# Patient Record
Sex: MALE | Race: WHITE | Employment: FULL TIME | URBAN - METROPOLITAN AREA
[De-identification: names, ages, dates, MRNs, and addresses within clinical notes are randomized per-mention and may not be internally consistent; named-entity substitution may affect disease eponyms.]

---

## 2018-02-15 ENCOUNTER — HOSPITAL ENCOUNTER (INPATIENT)
Age: 23
LOS: 6 days | Discharge: HOME | DRG: 881 | End: 2018-02-22
Attending: EMERGENCY MEDICINE | Admitting: PSYCHIATRY & NEUROLOGY
Payer: COMMERCIAL

## 2018-02-15 DIAGNOSIS — F29 PSYCHOSIS, UNSPECIFIED PSYCHOSIS TYPE (HCC): Primary | ICD-10-CM

## 2018-02-15 LAB
ALBUMIN SERPL-MCNC: 4.4 G/DL (ref 3.5–5)
ALBUMIN/GLOB SERPL: 1.2 {RATIO} (ref 1.1–2.2)
ALP SERPL-CCNC: 57 U/L (ref 45–117)
ALT SERPL-CCNC: 45 U/L (ref 12–78)
AMPHET UR QL SCN: NEGATIVE
ANION GAP SERPL CALC-SCNC: 16 MMOL/L (ref 5–15)
APPEARANCE UR: ABNORMAL
AST SERPL-CCNC: 27 U/L (ref 15–37)
BACTERIA URNS QL MICRO: NEGATIVE /HPF
BARBITURATES UR QL SCN: NEGATIVE
BASOPHILS # BLD: 0.1 K/UL (ref 0–0.1)
BASOPHILS NFR BLD: 1 % (ref 0–1)
BENZODIAZ UR QL: NEGATIVE
BILIRUB SERPL-MCNC: 0.6 MG/DL (ref 0.2–1)
BILIRUB UR QL CFM: NEGATIVE
BUN SERPL-MCNC: 17 MG/DL (ref 6–20)
BUN/CREAT SERPL: 15 (ref 12–20)
CALCIUM SERPL-MCNC: 9.7 MG/DL (ref 8.5–10.1)
CANNABINOIDS UR QL SCN: POSITIVE
CHLORIDE SERPL-SCNC: 102 MMOL/L (ref 97–108)
CO2 SERPL-SCNC: 21 MMOL/L (ref 21–32)
COCAINE UR QL SCN: NEGATIVE
COLOR UR: ABNORMAL
CREAT SERPL-MCNC: 1.11 MG/DL (ref 0.7–1.3)
DIFFERENTIAL METHOD BLD: ABNORMAL
DRUG SCRN COMMENT,DRGCM: ABNORMAL
EOSINOPHIL # BLD: 0 K/UL (ref 0–0.4)
EOSINOPHIL NFR BLD: 0 % (ref 0–7)
EPITH CASTS URNS QL MICRO: ABNORMAL /LPF
ERYTHROCYTE [DISTWIDTH] IN BLOOD BY AUTOMATED COUNT: 13.2 % (ref 11.5–14.5)
ETHANOL SERPL-MCNC: <10 MG/DL
GLOBULIN SER CALC-MCNC: 3.6 G/DL (ref 2–4)
GLUCOSE SERPL-MCNC: 117 MG/DL (ref 65–100)
GLUCOSE UR STRIP.AUTO-MCNC: NEGATIVE MG/DL
HCT VFR BLD AUTO: 44.8 % (ref 36.6–50.3)
HGB BLD-MCNC: 15.6 G/DL (ref 12.1–17)
HGB UR QL STRIP: NEGATIVE
IMM GRANULOCYTES # BLD: 0 K/UL (ref 0–0.04)
IMM GRANULOCYTES NFR BLD AUTO: 0 % (ref 0–0.5)
KETONES UR QL STRIP.AUTO: 40 MG/DL
LEUKOCYTE ESTERASE UR QL STRIP.AUTO: ABNORMAL
LYMPHOCYTES # BLD: 3.5 K/UL (ref 0.8–3.5)
LYMPHOCYTES NFR BLD: 26 % (ref 12–49)
MCH RBC QN AUTO: 29.8 PG (ref 26–34)
MCHC RBC AUTO-ENTMCNC: 34.8 G/DL (ref 30–36.5)
MCV RBC AUTO: 85.5 FL (ref 80–99)
METHADONE UR QL: NEGATIVE
MONOCYTES # BLD: 1.5 K/UL (ref 0–1)
MONOCYTES NFR BLD: 11 % (ref 5–13)
MUCOUS THREADS URNS QL MICRO: ABNORMAL /LPF
NEUTS SEG # BLD: 8.4 K/UL (ref 1.8–8)
NEUTS SEG NFR BLD: 62 % (ref 32–75)
NITRITE UR QL STRIP.AUTO: NEGATIVE
NRBC # BLD: 0 K/UL (ref 0–0.01)
NRBC BLD-RTO: 0 PER 100 WBC
OPIATES UR QL: NEGATIVE
PCP UR QL: NEGATIVE
PH UR STRIP: 6 [PH] (ref 5–8)
PLATELET # BLD AUTO: 456 K/UL (ref 150–400)
PMV BLD AUTO: 9.9 FL (ref 8.9–12.9)
POTASSIUM SERPL-SCNC: 3.6 MMOL/L (ref 3.5–5.1)
PROT SERPL-MCNC: 8 G/DL (ref 6.4–8.2)
PROT UR STRIP-MCNC: 30 MG/DL
RBC # BLD AUTO: 5.24 M/UL (ref 4.1–5.7)
RBC #/AREA URNS HPF: ABNORMAL /HPF (ref 0–5)
SODIUM SERPL-SCNC: 139 MMOL/L (ref 136–145)
SP GR UR REFRACTOMETRY: 1.02 (ref 1–1.03)
UA: UC IF INDICATED,UAUC: ABNORMAL
UROBILINOGEN UR QL STRIP.AUTO: 0.2 EU/DL (ref 0.2–1)
WBC # BLD AUTO: 13.5 K/UL (ref 4.1–11.1)
WBC URNS QL MICRO: ABNORMAL /HPF (ref 0–4)

## 2018-02-15 PROCEDURE — 74011250637 HC RX REV CODE- 250/637: Performed by: PHYSICIAN ASSISTANT

## 2018-02-15 PROCEDURE — 80307 DRUG TEST PRSMV CHEM ANLYZR: CPT | Performed by: PHYSICIAN ASSISTANT

## 2018-02-15 PROCEDURE — 85025 COMPLETE CBC W/AUTO DIFF WBC: CPT | Performed by: PHYSICIAN ASSISTANT

## 2018-02-15 PROCEDURE — 81001 URINALYSIS AUTO W/SCOPE: CPT | Performed by: PHYSICIAN ASSISTANT

## 2018-02-15 PROCEDURE — 99285 EMERGENCY DEPT VISIT HI MDM: CPT

## 2018-02-15 PROCEDURE — 36415 COLL VENOUS BLD VENIPUNCTURE: CPT | Performed by: PHYSICIAN ASSISTANT

## 2018-02-15 PROCEDURE — 80053 COMPREHEN METABOLIC PANEL: CPT | Performed by: PHYSICIAN ASSISTANT

## 2018-02-15 PROCEDURE — 90791 PSYCH DIAGNOSTIC EVALUATION: CPT

## 2018-02-15 RX ORDER — LORAZEPAM 1 MG/1
1 TABLET ORAL
Status: COMPLETED | OUTPATIENT
Start: 2018-02-15 | End: 2018-02-15

## 2018-02-15 RX ADMIN — LORAZEPAM 1 MG: 1 TABLET ORAL at 22:20

## 2018-02-15 NOTE — IP AVS SNAPSHOT
[address] [facility] 
 
 
 [address] Sagar Hickman Patient: Jey Nguyen MRN: [id number] :1995 A check lisa indicates which time of day the medication should be taken. My Medications START taking these medications Instructions Each Dose to Equal  
 Morning Noon Evening Bedtime  
 escitalopram oxalate 5 mg tablet Commonly known as:  Seretha Merced Start taking on:  2018 Your last dose was: Your next dose is: Take 1 Tab by mouth daily for 14 days. Indications: ANXIETY WITH DEPRESSION  
 5 mg OLANZapine 10 mg tablet Commonly known as:  ZyPREXA Your last dose was: Your next dose is: Take 1 Tab by mouth two (2) times a day for 14 days. Indications: DEPRESSION TREATMENT ADJUNCT, Sveta associated with Bipolar Disorder 10 mg  
    
   
   
   
  
  
STOP taking these medications ATIVAN 1 mg tablet Generic drug:  LORazepam  
   
  
  
  
Where to Get Your Medications Information on where to get these meds will be given to you by the nurse or doctor. ! Ask your nurse or doctor about these medications  
  escitalopram oxalate 5 mg tablet OLANZapine 10 mg tablet

## 2018-02-15 NOTE — ED TRIAGE NOTES
Unsure why patient is here tonight. He relays \" I want to make sure my man is OK\". Patient unable to relay why he is here today. Mother states patient is not talking rationally. Patient was in [address] ED, transferred to [address] [place] [address] under TDO, Samaria An discharged after examining this am. Patient has hx of anxiety, took Ativan 1mg that was prescribed.  Denies HI/SI

## 2018-02-15 NOTE — IP AVS SNAPSHOT
[facility] 
 
 
 [address] Sagar Keyur Palma Patient: Cuate  MRN: [id number] :1995 About your hospitalization You were admitted on:  2018 You last received care in the:  [facility]. 291 You were discharged on:  2018 Why you were hospitalized Your primary diagnosis was:  Not on File Your diagnoses also included:  Psychosis, Anxiety Follow-up Information Follow up With Details Comments Contact Info Eileen Delgadillo NP  Please follow up with Eileen Delgadillo NP on Thurs. 3/15/18 at 12:30pm. Arrive 20-30 minutes early to complete paperwork. Bring photo ID and insurance card. You will also be scheduled with a therapist in the future. Magen Jewellrashmi Ana Mariajez [address] Phone:  [phone]) Fax:   [phone] None   None ([phone]) Patient stated that they have no PCP 
  
 therapists  If you prefer to try to find a therapist sooner than when [facility] can arrange, please see attached list of therapists and/or seek one on www. [email or web address]. [employer] Discharge Orders None A check lisa indicates which time of day the medication should be taken. My Medications START taking these medications Instructions Each Dose to Equal  
 Morning Noon Evening Bedtime  
 escitalopram oxalate 5 mg tablet Commonly known as:  Guanako Lawson Start taking on:  2018 Your last dose was: Your next dose is: Take 1 Tab by mouth daily for 14 days. Indications: ANXIETY WITH DEPRESSION  
 5 mg OLANZapine 10 mg tablet Commonly known as:  ZyPREXA Your last dose was: Your next dose is: Take 1 Tab by mouth two (2) times a day for 14 days. Indications: DEPRESSION TREATMENT ADJUNCT, Sveta associated with Bipolar Disorder  10 mg  
    
   
   
   
  
 STOP taking these medications ATIVAN 1 mg tablet Generic drug:  LORazepam  
   
  
  
  
Where to Get Your Medications Information on where to get these meds will be given to you by the nurse or doctor. ! Ask your nurse or doctor about these medications  
  escitalopram oxalate 5 mg tablet OLANZapine 10 mg tablet Discharge Instructions DISCHARGE SUMMARY from Nurse The following personal items are in your possession at time of discharge: 
 
Dental Appliances: None Visual Aid: Glasses, With patient Home Medications: None Jewelry: None Clothing: Shirt, Socks, Sweater, Undergarments, Pants, Footwear, Darlin Other Valuables: Money (comment) ($8) 
Personal Items Sent to Safe: $8 
 
 
PATIENT INSTRUCTIONS: 
 
If I feel that I can not keep these promises and I am at risk of hurting myself or others, I will call the crisis office and speak with a crisis worker who will assist me during my crisis. [address] [place] [address]  [phone] [address] [place] [address]   [phone] [facility] Ghanshyam [place] Crisis  [phone] [facility]  [phone] Lifestyle Tips: 
Appointment after discharge and follow up phone call: After being discharged, if your appointment does not work for you, please feel free to contact the physician's office to change the appointment. Medications: Take your medicines exactly as instructed. Ask your doctor or nurse if you have questions about your medicines. Tell your doctor right away if you have problems with your medicines. Diet: 
 Follow any special diet orders from your doctor. Ask your doctor how much salt (sodium) you should have each day. Ask if you need to limit the amount or types of fluids you drink each day. Weight Monitoring: If you are overweight, ask about a weight loss plan that is right for you. Weight gain may mean that fluids are building up in your body. Weigh yourself every day at about the same time and write it down. Do Not Smoke: If you smoke, quit. Smoking is bad for your health. Avoid second hand smoke. Call the [address] for help at 0-331.780.6119 [facility] Now) Other Tips: 
 Avoid people with the flu or pneumonia Keep all of your doctor appointments Carry a list of your medications, allergies and the shots you have had These are general instructions for a healthy lifestyle: No smoking/ No tobacco products/ Avoid exposure to second hand smoke Surgeon General's Warning:  Quitting smoking now greatly reduces serious risk to your health. Obesity, smoking, and sedentary lifestyle greatly increases your risk for illness A healthy diet, regular physical exercise & weight monitoring are important for maintaining a healthy lifestyle Recognize signs and symptoms of STROKE: 
 
F-face looks uneven A-arms unable to move or move unevenly S-speech slurred or non-existent T-time-call 911 as soon as signs and symptoms begin-DO NOT go Back to bed or wait to see if you get better-TIME IS BRAIN. Warning Signs of HEART ATTACK Call 911 if you have these symptoms: 
? Chest discomfort. Most heart attacks involve discomfort in the center of the chest that lasts more than a few minutes, or that goes away and comes back. It can feel like uncomfortable pressure, squeezing, fullness, or pain. ? Discomfort in other areas of the upper body. Symptoms can include pain or discomfort in one or both arms, the back, neck, jaw, or stomach. ? Shortness of breath with or without chest discomfort. ? Other signs may include breaking out in a cold sweat, nausea, or lightheadedness. Don't wait more than five minutes to call [address]! Fast action can save your life. Calling 911 is almost always the fastest way to get lifesaving treatment.  Emergency Medical Services staff can begin treatment when they arrive  up to an hour sooner than if someone gets to the hospital by car. Myself and/or my family have received education about my diagnosis throughout my hospital stay. During my hospital stay, I and/or my family/caregiver were included in planning my care upon discharge. My needs were taken into consideration and I was included in my discharge planning. I had an opportunity to ask questions. The discharge information has been reviewed with the patient. The patient verbalized understanding. Discharge medications reviewed with the patient and appropriate educational materials and side effects teaching were provided. Introducing [facility]! [facility] introduces Schedulize patient portal. Now you can access parts of your medical record, email your doctor's office, and request medication refills online. 1. In your internet browser, go to [email or web address] 2. Click on the First Time User? Click Here link in the Sign In box. You will see the New Member Sign Up page. 3. Enter your Schedulize Access Code exactly as it appears below. You will not need to use this code after youve completed the sign-up process. If you do not sign up before the expiration date, you must request a new code. · Schedulize Access Code: [email or web address] Expires: 5/23/2018 10:16 AM 
 
4. Enter the last four digits of your Social Security Number (xxxx) and Date of Birth (mm/dd/yyyy) as indicated and click Submit. You will be taken to the next sign-up page. 5. Create a Schedulize ID. This will be your Schedulize login ID and cannot be changed, so think of one that is secure and easy to remember. 6. Create a Schedulize password. You can change your password at any time. 7. Enter your Password Reset Question and Answer. This can be used at a later time if you forget your password. 8. Enter your e-mail address.  You will receive e-mail notification when new information is available in Printi. 9. Click Sign Up. You can now view and download portions of your medical record. 10. Click the Download Summary menu link to download a portable copy of your medical information. If you have questions, please visit the Frequently Asked Questions section of the Printi website. Remember, Printi is NOT to be used for urgent needs. For medical emergencies, dial 911. Now available from your iPhone and Android! Providers Seen During Your Hospitalization Provider Specialty Primary office phone Sima Martinez MD Emergency Medicine [phone] Kylie Gil MD Psychiatry [phone] Abbe De Los Santos MD Psychiatry [phone] Immunizations Administered for This Admission Name Date Influenza Vaccine (Quad) PF 2/16/2018 Your Primary Care Physician (PCP) Primary Care Physician Office Phone Office Fax NONE ** None ** ** None ** You are allergic to the following No active allergies Recent Documentation Height Weight BMI Smoking Status 1.905 m 90.7 kg 25 kg/m2 Current Some Day Smoker Emergency Contacts Name Discharge Info Relation Home Work Mobile Kirstin Jewels [1] Boyfriend [17] [phone] Vahid Salts [1] Sister [23] [phone] Patient Belongings The following personal items are in your possession at time of discharge: 
  Dental Appliances: None  Visual Aid: Glasses, With patient      Home Medications: None   Jewelry: None  Clothing: Shirt, Socks, Sweater, Undergarments, Pants, Footwear, Darlin    Other Valuables: Money (comment) ($8)  Personal Items Sent to Safe: $8 
 
  
  
 Please provide this summary of care documentation to your next provider. Signatures-by signing, you are acknowledging that this After Visit Summary has been reviewed with you and you have received a copy.   
  
 
  
    
    
 Patient Signature: ____________________________________________________________ Date:  ____________________________________________________________  
  
Hurshel Och Provider Signature:  ____________________________________________________________ Date:  ____________________________________________________________

## 2018-02-15 NOTE — IP AVS SNAPSHOT
Summary of Care Report The Summary of Care report has been created to help improve care coordination. Users with access to [employer] network or [address] (Web-based application) may access additional patient information including the Discharge Summary. If you are not currently a [address] user and need more information, please call the number listed below in the [place] [address] section and ask to be connected with Medical Records. Facility Information Name Address Phone [facility] [address] [place] [id number] [phone] Patient Information Patient Name Sex ARABELLA Orozco ([id number]) Male 1995 Discharge Information Admitting Provider Service Area Unit Nathanael Navarro MD / [phone] [address] [address] [facility] / [phone] Discharge Provider Discharge Date/Time Discharge Disposition Destination (none) 2018 Midday (Pending) AHR (none) Patient Language Language ENGLISH [13] Hospital Problems as of 2018  Never Reviewed Class Noted - Resolved Last Modified POA Active Problems Psychosis  2/15/2018 - Present 2/15/2018 by Nathanael Navarro MD Unknown Entered by Nathanael Navarro MD  
  Anxiety  2018 - Present 2018 by Nathanael Navarro MD Unknown Entered by Nathanael Navarro MD  
  
You are allergic to the following No active allergies Current Discharge Medication List  
  
START taking these medications Dose & Instructions Dispensing Information Comments  
 escitalopram oxalate 5 mg tablet Commonly known as:  Clevester Clap Start taking on:  2018 Dose:  5 mg Take 1 Tab by mouth daily for 14 days. Indications: ANXIETY WITH DEPRESSION Quantity:  14 Tab Refills:  1 OLANZapine 10 mg tablet Commonly known as:  ZyPREXA  Dose:  10 mg  
 Take 1 Tab by mouth two (2) times a day for 14 days. Indications: DEPRESSION TREATMENT ADJUNCT, Sveta associated with Bipolar Disorder Quantity:  28 Tab Refills:  1 STOP taking these medications Comments ATIVAN 1 mg tablet Generic drug:  LORazepam  
   
   
  
  
  
Current Immunizations Name Date Influenza Vaccine (Quad) PF 2/16/2018 Follow-up Information Follow up With Details Comments Contact Info Eileen Delgadillo NP  Please follow up with Eileen Delgadillo NP on Thurs. 3/15/18 at 12:30pm. Arrive 20-30 minutes early to complete paperwork. Bring photo ID and insurance card. You will also be scheduled with a therapist in the future. Magen Rodgersjez 424 [address] Phone:  [phone]) Fax:   [phone] None   None (435) Patient stated that they have no PCP 
  
 therapists  If you prefer to try to find a therapist sooner than when [facility] can arrange, please see attached list of therapists and/or seek one on [email or web address] Discharge Instructions DISCHARGE SUMMARY from Nurse The following personal items are in your possession at time of discharge: 
 
Dental Appliances: None Visual Aid: Glasses, With patient Home Medications: None Jewelry: None Clothing: Shirt, Socks, Sweater, Undergarments, Pants, Footwear, Darlin Other Valuables: Money (comment) ($8) 
Personal Items Sent to Safe: $8 
 
 
PATIENT INSTRUCTIONS: 
 
If I feel that I can not keep these promises and I am at risk of hurting myself or others, I will call the crisis office and speak with a crisis worker who will assist me during my crisis. [address] [place] [address]  [phone] [address] [place] [address] 19   [phone] [place] [facility] [place] Crisis  [phone] [facility] Crisis  [phone] Lifestyle Tips: 
Appointment after discharge and follow up phone call: After being discharged, if your appointment does not work for you, please feel free to contact the physician's office to change the appointment. Medications: Take your medicines exactly as instructed. Ask your doctor or nurse if you have questions about your medicines. Tell your doctor right away if you have problems with your medicines. Diet: 
 Follow any special diet orders from your doctor. Ask your doctor how much salt (sodium) you should have each day. Ask if you need to limit the amount or types of fluids you drink each day. Weight Monitoring: If you are overweight, ask about a weight loss plan that is right for you. Weight gain may mean that fluids are building up in your body. Weigh yourself every day at about the same time and write it down. Do Not Smoke: If you smoke, quit. Smoking is bad for your health. Avoid second hand smoke. Call the [employer] for help at 1-957.681.8858 [facility] Now) Other Tips: 
 Avoid people with the flu or pneumonia Keep all of your doctor appointments Carry a list of your medications, allergies and the shots you have had These are general instructions for a healthy lifestyle: No smoking/ No tobacco products/ Avoid exposure to second hand smoke Surgeon General's Warning:  Quitting smoking now greatly reduces serious risk to your health. Obesity, smoking, and sedentary lifestyle greatly increases your risk for illness A healthy diet, regular physical exercise & weight monitoring are important for maintaining a healthy lifestyle Recognize signs and symptoms of STROKE: 
 
F-face looks uneven A-arms unable to move or move unevenly S-speech slurred or non-existent T-time-call 911 as soon as signs and symptoms begin-DO NOT go Back to bed or wait to see if you get better-TIME IS BRAIN. Warning Signs of HEART ATTACK Call 911 if you have these symptoms: ? Chest discomfort. Most heart attacks involve discomfort in the center of the chest that lasts more than a few minutes, or that goes away and comes back. It can feel like uncomfortable pressure, squeezing, fullness, or pain. ? Discomfort in other areas of the upper body. Symptoms can include pain or discomfort in one or both arms, the back, neck, jaw, or stomach. ? Shortness of breath with or without chest discomfort. ? Other signs may include breaking out in a cold sweat, nausea, or lightheadedness. Don't wait more than five minutes to call [address]! Fast action can save your life. Calling 911 is almost always the fastest way to get lifesaving treatment. Emergency Medical Services staff can begin treatment when they arrive  up to an hour sooner than if someone gets to the hospital by car. Myself and/or my family have received education about my diagnosis throughout my hospital stay. During my hospital stay, I and/or my family/caregiver were included in planning my care upon discharge. My needs were taken into consideration and I was included in my discharge planning. I had an opportunity to ask questions. The discharge information has been reviewed with the patient. The patient verbalized understanding. Discharge medications reviewed with the patient and appropriate educational materials and side effects teaching were provided. Chart Review Routing History No Routing History on File

## 2018-02-16 PROBLEM — F41.9 ANXIETY: Status: ACTIVE | Noted: 2018-02-16

## 2018-02-16 PROCEDURE — 90686 IIV4 VACC NO PRSV 0.5 ML IM: CPT | Performed by: PSYCHIATRY & NEUROLOGY

## 2018-02-16 PROCEDURE — 74011250637 HC RX REV CODE- 250/637: Performed by: PHYSICIAN ASSISTANT

## 2018-02-16 PROCEDURE — 74011250637 HC RX REV CODE- 250/637: Performed by: PSYCHIATRY & NEUROLOGY

## 2018-02-16 PROCEDURE — 74011250636 HC RX REV CODE- 250/636: Performed by: PSYCHIATRY & NEUROLOGY

## 2018-02-16 PROCEDURE — 90471 IMMUNIZATION ADMIN: CPT

## 2018-02-16 PROCEDURE — 65220000003 HC RM SEMIPRIVATE PSYCH

## 2018-02-16 RX ORDER — OLANZAPINE 5 MG/1
5 TABLET ORAL EVERY EVENING
Status: DISCONTINUED | OUTPATIENT
Start: 2018-02-16 | End: 2018-02-17

## 2018-02-16 RX ORDER — BENZTROPINE MESYLATE 2 MG/1
2 TABLET ORAL
Status: DISCONTINUED | OUTPATIENT
Start: 2018-02-16 | End: 2018-02-22 | Stop reason: HOSPADM

## 2018-02-16 RX ORDER — LORAZEPAM 2 MG/ML
2 INJECTION INTRAMUSCULAR
Status: DISCONTINUED | OUTPATIENT
Start: 2018-02-16 | End: 2018-02-22 | Stop reason: HOSPADM

## 2018-02-16 RX ORDER — ADHESIVE BANDAGE
30 BANDAGE TOPICAL DAILY PRN
Status: DISCONTINUED | OUTPATIENT
Start: 2018-02-16 | End: 2018-02-22 | Stop reason: HOSPADM

## 2018-02-16 RX ORDER — IBUPROFEN 400 MG/1
400 TABLET ORAL
Status: DISCONTINUED | OUTPATIENT
Start: 2018-02-16 | End: 2018-02-22 | Stop reason: HOSPADM

## 2018-02-16 RX ORDER — HALOPERIDOL 5 MG/1
5 TABLET ORAL
Status: COMPLETED | OUTPATIENT
Start: 2018-02-16 | End: 2018-02-16

## 2018-02-16 RX ORDER — OLANZAPINE 5 MG/1
5 TABLET ORAL
Status: DISCONTINUED | OUTPATIENT
Start: 2018-02-16 | End: 2018-02-22 | Stop reason: HOSPADM

## 2018-02-16 RX ORDER — LORAZEPAM 1 MG/1
1 TABLET ORAL 3 TIMES DAILY
COMMUNITY
End: 2018-02-22

## 2018-02-16 RX ORDER — ACETAMINOPHEN 325 MG/1
650 TABLET ORAL
Status: DISCONTINUED | OUTPATIENT
Start: 2018-02-16 | End: 2018-02-22 | Stop reason: HOSPADM

## 2018-02-16 RX ORDER — DIPHENHYDRAMINE HCL 25 MG
25 CAPSULE ORAL
Status: COMPLETED | OUTPATIENT
Start: 2018-02-16 | End: 2018-02-16

## 2018-02-16 RX ORDER — ZOLPIDEM TARTRATE 10 MG/1
10 TABLET ORAL
Status: DISCONTINUED | OUTPATIENT
Start: 2018-02-16 | End: 2018-02-22 | Stop reason: HOSPADM

## 2018-02-16 RX ORDER — ESCITALOPRAM OXALATE 10 MG/1
5 TABLET ORAL DAILY
Status: DISCONTINUED | OUTPATIENT
Start: 2018-02-16 | End: 2018-02-22 | Stop reason: HOSPADM

## 2018-02-16 RX ORDER — BENZTROPINE MESYLATE 1 MG/ML
2 INJECTION INTRAMUSCULAR; INTRAVENOUS
Status: DISCONTINUED | OUTPATIENT
Start: 2018-02-16 | End: 2018-02-22 | Stop reason: HOSPADM

## 2018-02-16 RX ORDER — LORAZEPAM 1 MG/1
1 TABLET ORAL
Status: DISCONTINUED | OUTPATIENT
Start: 2018-02-16 | End: 2018-02-19

## 2018-02-16 RX ADMIN — INFLUENZA VIRUS VACCINE 0.5 ML: 15; 15; 15; 15 SUSPENSION INTRAMUSCULAR at 10:23

## 2018-02-16 RX ADMIN — ZOLPIDEM TARTRATE 10 MG: 10 TABLET ORAL at 21:04

## 2018-02-16 RX ADMIN — OLANZAPINE 5 MG: 5 TABLET, FILM COATED ORAL at 17:00

## 2018-02-16 RX ADMIN — DIPHENHYDRAMINE HYDROCHLORIDE 25 MG: 25 CAPSULE ORAL at 00:05

## 2018-02-16 RX ADMIN — HALOPERIDOL 5 MG: 5 TABLET ORAL at 00:05

## 2018-02-16 RX ADMIN — LORAZEPAM 1 MG: 1 TABLET ORAL at 10:21

## 2018-02-16 RX ADMIN — ESCITALOPRAM OXALATE 5 MG: 10 TABLET ORAL at 10:20

## 2018-02-16 NOTE — ED NOTES
Pt medicated for continued anxiety, VSS. Friend at bedside providing support. Will continue to monitor.

## 2018-02-16 NOTE — BSMART NOTE
Comprehensive Assessment Form Part 1    Section I - Disposition    Axis I - Unspecified Psychosis  Axis II - Deferred  Axis III - None  Axis IV - Stressors related to social group  [facility] - 30      The Medical Doctor to Psychiatrist conference was not completed. The Medical Doctor is in agreement with Psychiatrist disposition because of (reason) patient is willing to be treated voluntarily. The plan is admit to [facility].  The on-call Psychiatrist consulted was Dr. Moe Morejon. The admitting Psychiatrist will be Dr. Moe Morejon. The admitting Diagnosis is Unspecified Psychosis. The Payor source is [employer]. Section II - Integrated Summary  Summary:  IP is a 23year old male seen face to face in the ER. Patient was brought in by his mother and sister for a mental health evaluation because he is having periods where he is not speaking rationally. He has not been sleeping and they are concerned about his ability to care for himself based on his current presentation. He has a history of treatment for anxiety when he was in middle school, high school, and college, however when he moved to [facility] he did not continue with psychiatric treatment. He was taken by his partner to [facility] around midnight on Tuesday, and was [facility] Wednesday afternoon for an unknown reason. He was TDOed to [email or web address] last night because there were no beds, and was transferred there around 3am.  He had his hearing today and was released as he was not deemed to be acute enough to need treatment at the [facility].  He has no current outpatient treatment providers. Patient denies suicidal and homicidal ideation. When he initially arrived in the ER he reported he came to the hospital because \"I want to make sure my man's okay. \"  He then told the ER provider he came to the ER because \"I'm trying to get  and there are 4 things I have to do. \"  When asked by this  why he came to the ER, he stated \"I want to get better. \"  He is very focused about his relationship with his partner, and being concerned that he wants to leave him, although his partner states there is no reason for him to be concerned about this. He also keeps bringing up wanting to get , which apparently has also never been discussed prior to the past few days. He does realize that his thoughts have been very \"scattered\" recently and he has been told he is not making sense. He reported he would like to figure out why this is happening and get help. Patient has a full time job at [employer]. He reports he has not slept but a few hours over the past 4 days. He also reports he has not had much of an appetite. His mother reports that he has a group of friends that he plays Dungeons and Dragons with that he has been friends with for years. He has struggled with leaving the group of friends because they are not healthy individuals. Patient told one this week that he no longer wanted to participate in the group. He now feels responsible for what \"damage he (the friend) could do to himself or his home. \"  Patient was pleasant and cooperative with the assessment. He did present as somewhat labile at times, and cried several times unexpectedly. He was easily redirectable. He is willing to be treated voluntarily. The patient has demonstrated mental capacity to provide informed consent. The information is given by the patient, SO, mother, and sister. The Chief Complaint is mental health problem. The Precipitant Factors are stressors related to social group. Previous Hospitalizations: yes, yesterday was TDOed to [facility] and was released today at hearing  The patient has not previously been in restraints. Current Psychiatrist and/or  is NA. Lethality Assessment:    The potential for suicide is not noted. The potential for homicide is not noted. The patient has not been a perpetrator of sexual or physical abuse.   There are not pending charges. The patient is not felt to be at risk for self harm or harm to others. The attending nurse was advised that security has not been notified. Section III - Psychosocial  The patient's overall mood and attitude is anxious. Feelings of helplessness and hopelessness are not observed. Generalized anxiety is observed by patient's behaviors and self report. Panic is not observed. Phobias are not observed. Obsessive compulsive tendencies are not observed. Section IV - Mental Status Exam  The patient's appearance is tense. The patient's behavior is restless. The patient is oriented to time, place, person and situation. The patient's speech is pressured. The patient's mood is anxious. The range of affect is labile. The patient's thought content demonstrates no evidence of impairment. The thought process shows a flight of ideas and is tangential.  The patient's perception shows no evidence of impairment. The patient's memory shows no evidence of impairment. The patient's appetite is decreased. The patient's sleep has evidence of insomnia. The patient's insight shows no evidence of impairment. The patient's judgement is psychologically impaired. Section V - Substance Abuse  The patient is using substances. The patient is using cannabis by inhalation for 1-5 years with last use on 2/13/18. The patient has experienced the following withdrawal symptoms: N/A. Section VI - Living Arrangements  The patient has a significant other. This person's approximate age is 22 and appears to be in good health. The patient lives with a significant other. The patient has no children. The patient does plan to return home upon discharge. The patient does not have legal issues pending. The patient's source of income comes from employment. Rastafarian and cultural practices have not been voiced at this time.     The patient's greatest support comes from his mother and this person will not be involved with the treatment. The patient has not been in an event described as horrible or outside the realm of ordinary life experience either currently or in the past.  The patient has not been a victim of sexual/physical abuse. Section VII - Other Areas of Clinical Concern  The highest grade achieved is college with the overall quality of school experience being described as unknown. The patient is currently employed and speaks [place] as a primary language. The patient has no communication impairments affecting communication. The patient's preference for learning can be described as: can read and write adequately.   The patient's hearing is normal.  The patient's vision is normal.      Awa Mcintyre

## 2018-02-16 NOTE — ED NOTES
Bedside and Verbal shift change report given to [address] (oncoming nurse) by Sharon Arreaga (offgoing nurse). Report included the following information SBAR, Kardex and ED Summary.

## 2018-02-16 NOTE — BH NOTES
PSYCHOSOCIAL ASSESSMENT  :Patient identifying info:  Que Page is a 23 y.o., male admitted 2/15/2018  6:15 PM     Presenting problem and precipitating factors: Pt was admitted on a voluntary status due to increased anxiety and bizarre behaviors which are not baseline behaviors for the pt. Collateral info: Per pt's boyfriend Karina Avelar around Jan. 5th the pt had difficulty sleeping and was drinking several Red Bulls to stay up because he was binge watching a Netflixd series. In December he was obsessed with the future and virtual reality. When the pt decided to leave the [employer] group he feels that his friend Wallace Nunez is upset with him. Mental status assessment: Pt was alert and oriented. Pt denies SI/HI. Pt is free of delusions. Pt's mood was anxious, affect was congruent with mood. Pt's thought process was logical. Pt's insight and judgment was impaired     Current psychiatric providers and contact info: Pt has been diagnosed with Anxiety Disorder. He currently doesn't have a mental health provider. He is open to referrals     Previous psychiatric services/providers and response to treatment: This is the pt's first psychiatric  admission     Family history of mental illness : Unknown     Substance abuse history:    Social History   Substance Use Topics    Smoking status: Current Some Day Smoker    Smokeless tobacco: Current User    Alcohol use Yes   Pt uses marijuana daily, he has experienced with magic mushrooms , ectasy      Family constellation: Pt's mother and stepfather live in [place], [place]. Pt has a sister  Aissatou Snow [phone] and brother    Is significant other involved?  Pt is involved in a homosexual relationship      Describe support system: Pt reported that he has played  Dungeons and Dragons with group of friends he considered  family , despite them not being the best influence in his life     Describe living arrangements and home environment: Pt lives with is boyfriend Ines Parson [phone]  Health issues:   Hospital Problems  Never Reviewed          Codes Class Noted POA    Anxiety ICD-10-CM: F41.9  ICD-9-CM: 300.00  2018 Unknown        Psychosis ICD-10-CM: F29  ICD-9-CM: 298.9  2/15/2018 Unknown              Trauma history: None reported     Legal issues: None reported     History of  service: N/A     Financial status: Pt is employed full-time     Sikh/cultural factors: None reported     Education/work history: Pt graduated from [address] [place] [address] with a Psychology degree, works full-time at [address] [place] [address] 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

## 2018-02-16 NOTE — H&P
INITIAL PSYCHIATRIC EVALUATION            IDENTIFICATION:    Patient Name  Mayra Brown   Date of Birth 1995   [facility] [id number]   Medical Record Number  [id number]      Age  23 y.o. PCP None   Admit date:  2/15/2018    Room Number  325/02  @ [facility]   Date of Service  2/16/2018            HISTORY         REASON FOR HOSPITALIZATION:  CC:  Pt admitted under a voluntary basis for severe depression with suicidal ideations  proving to be an imminent danger to self and an inability to care for self. HISTORY OF PRESENT ILLNESS:    The patient, Mayra Brown, is a 23 y.o. WHITE OR  male with a past psychiatric history significant for depression , who presents at this time with complaints of (and/or evidence of) the following emotional symptoms: depression, suicidal thoughts/threats and anxiety. Additional symptomatology include anxiety, depression worse and difficulty sleeping. The above symptoms have been present for months . These symptoms are of moderate severity. These symptoms are intermittent/ fleeting in nature. The patient's condition has been precipitated by problems with family and psychosocial stressors (issues with relationship  ). Patient's condition made worse by continued illicit drug use and alcohol use as well as treatment noncompliance. UDS: positive ; BAL=0. He is feeling anxious and depressed and he has panic attacks and has inna feeling depressed and has no Ah or VH and no suicidal ideation      ALLERGIES: No Known Allergies   MEDICATIONS PRIOR TO ADMISSION:   Prescriptions Prior to Admission   Medication Sig    LORazepam (ATIVAN) 1 mg tablet Take 1 mg by mouth three (3) times daily. PAST MEDICAL HISTORY:   History reviewed. No pertinent past medical history. History reviewed. No pertinent surgical history.    SOCIAL HISTORY: single    Social History     Social History    Marital status: SINGLE     Spouse name: N/A    Number of children: N/A    Years of education: N/A     Occupational History    Not on file. Social History Main Topics    Smoking status: Current Some Day Smoker    Smokeless tobacco: Current User    Alcohol use Yes    Drug use: Yes     Special: Marijuana    Sexual activity: Yes     Other Topics Concern    Not on file     Social History Narrative      FAMILY HISTORY: History reviewed. No pertinent family history. History reviewed. No pertinent family history. REVIEW OF SYSTEMS:   History obtained from the patient  Pertinent items are noted in the History of Present Illness. All other Systems reviewed and are considered negative. MENTAL STATUS EXAM & VITALS     MENTAL STATUS EXAM (MSE):    MSE FINDINGS ARE WITHIN NORMAL LIMITS (WNL) UNLESS OTHERWISE STATED BELOW. ( ALL OF THE BELOW CATEGORIES OF THE MSE HAVE BEEN REVIEWED (reviewed 2/16/2018) AND UPDATED AS DEEMED APPROPRIATE )  General Presentation age appropriate, cooperative   Orientation oriented to time, place and person   Vital Signs  See below (reviewed 2/16/2018); Vital Signs (BP, Pulse, & Temp) are within normal limits if not listed below.    Gait and Station Stable/steady, no ataxia   Musculoskeletal System No extrapyramidal symptoms (EPS); no abnormal muscular movements or Tardive Dyskinesia (TD); muscle strength and tone are within normal limits   Language No aphasia or dysarthria   Speech:  normal pitch and normal volume   Thought Processes logical; normal rate of thoughts; fair abstract reasoning/computation   Thought Associations goal directed   Thought Content free of delusions   Suicidal Ideations no intention   Homicidal Ideations no intention   Mood:  anxious    Affect:  anxious   Memory recent  good   Memory remote:  good   Concentration/Attention:  good   Fund of Knowledge average   Insight:  good   Reliability fair   Judgment:  limited          VITALS:     Patient Vitals for the past 24 hrs:   Temp Pulse Resp BP SpO2   02/16/18 0646 - (!) 129 20 140/84 -   02/16/18 0138 97.8 °F (36.6 °C) (!) 114 18 (!) 149/119 -   02/16/18 0011 97.7 °F (36.5 °C) (!) 112 18 136/85 98 %   02/15/18 1814 98.1 °F (36.7 °C) (!) 112 18 (!) 167/97 100 %     Wt Readings from Last 3 Encounters:   02/16/18 90.7 kg (200 lb)     Temp Readings from Last 3 Encounters:   02/16/18 97.8 °F (36.6 °C)     BP Readings from Last 3 Encounters:   02/16/18 140/84     Pulse Readings from Last 3 Encounters:   02/16/18 (!) 129            DATA     LABORATORY DATA:  Labs Reviewed   CBC WITH AUTOMATED DIFF - Abnormal; Notable for the following:        Result Value    WBC 13.5 (*)     PLATELET 845 (*)     ABS. NEUTROPHILS 8.4 (*)     ABS. MONOCYTES 1.5 (*)     All other components within normal limits   METABOLIC PANEL, COMPREHENSIVE - Abnormal; Notable for the following:      Anion gap 16 (*)     Glucose 117 (*)     All other components within normal limits   URINALYSIS W/ REFLEX CULTURE - Abnormal; Notable for the following:     Appearance CLOUDY (*)     Protein 30 (*)     Ketone 40 (*)     Leukocyte Esterase SMALL (*)     Mucus 1+ (*)     All other components within normal limits   DRUG SCREEN, URINE - Abnormal; Notable for the following:     THC (TH-CANNABINOL) POSITIVE (*)     All other components within normal limits   ETHYL ALCOHOL   BILIRUBIN, CONFIRM     Admission on 02/15/2018   Component Date Value Ref Range Status    WBC 02/15/2018 13.5* 4.1 - 11.1 K/uL Final    RBC 02/15/2018 5.24  4.10 - 5.70 M/uL Final    HGB 02/15/2018 15.6  12.1 - 17.0 g/dL Final    HCT 02/15/2018 44.8  36.6 - 50.3 % Final    MCV 02/15/2018 85.5  80.0 - 99.0 FL Final    MCH 02/15/2018 29.8  26.0 - 34.0 PG Final    MCHC 02/15/2018 34.8  30.0 - 36.5 g/dL Final    RDW 02/15/2018 13.2  11.5 - 14.5 % Final    PLATELET 34/78/1406 383* 150 - 400 K/uL Final    MPV 02/15/2018 9.9  8.9 - 12.9 FL Final    NRBC 02/15/2018 0.0  0  WBC Final    ABSOLUTE NRBC 02/15/2018 0.00  0.00 - 0.01 K/uL Final    NEUTROPHILS 02/15/2018 62  32 - 75 % Final    LYMPHOCYTES 02/15/2018 26  12 - 49 % Final    MONOCYTES 02/15/2018 11  5 - 13 % Final    EOSINOPHILS 02/15/2018 0  0 - 7 % Final    BASOPHILS 02/15/2018 1  0 - 1 % Final    IMMATURE GRANULOCYTES 02/15/2018 0  0.0 - 0.5 % Final    ABS. NEUTROPHILS 02/15/2018 8.4* 1.8 - 8.0 K/UL Final    ABS. LYMPHOCYTES 02/15/2018 3.5  0.8 - 3.5 K/UL Final    ABS. MONOCYTES 02/15/2018 1.5* 0.0 - 1.0 K/UL Final    ABS. EOSINOPHILS 02/15/2018 0.0  0.0 - 0.4 K/UL Final    ABS. BASOPHILS 02/15/2018 0.1  0.0 - 0.1 K/UL Final    ABS. IMM. GRANS. 02/15/2018 0.0  0.00 - 0.04 K/UL Final    DF 02/15/2018 AUTOMATED    Final    Sodium 02/15/2018 139  136 - 145 mmol/L Final    Potassium 02/15/2018 3.6  3.5 - 5.1 mmol/L Final    Chloride 02/15/2018 102  97 - 108 mmol/L Final    CO2 02/15/2018 21  21 - 32 mmol/L Final    Anion gap 02/15/2018 16* 5 - 15 mmol/L Final    Glucose 02/15/2018 117* 65 - 100 mg/dL Final    BUN 02/15/2018 17  6 - 20 MG/DL Final    Creatinine 02/15/2018 1.11  0.70 - 1.30 MG/DL Final    BUN/Creatinine ratio 02/15/2018 15  12 - 20   Final    GFR est AA 02/15/2018 >60  >60 ml/min/1.73m2 Final    GFR est non-AA 02/15/2018 >60  >60 ml/min/1.73m2 Final    Calcium 02/15/2018 9.7  8.5 - 10.1 MG/DL Final    Bilirubin, total 02/15/2018 0.6  0.2 - 1.0 MG/DL Final    ALT (SGPT) 02/15/2018 45  12 - 78 U/L Final    AST (SGOT) 02/15/2018 27  15 - 37 U/L Final    Alk.  phosphatase 02/15/2018 57  45 - 117 U/L Final    Protein, total 02/15/2018 8.0  6.4 - 8.2 g/dL Final    Albumin 02/15/2018 4.4  3.5 - 5.0 g/dL Final    Globulin 02/15/2018 3.6  2.0 - 4.0 g/dL Final    A-G Ratio 02/15/2018 1.2  1.1 - 2.2   Final    Color 02/15/2018 DARK YELLOW    Final    Appearance 02/15/2018 CLOUDY* CLEAR   Final    Specific gravity 02/15/2018 1.020  1.003 - 1.030   Final    pH (UA) 02/15/2018 6.0  5.0 - 8.0   Final    Protein 02/15/2018 30* NEG mg/dL Final    Glucose 02/15/2018 NEGATIVE NEG mg/dL Final    Ketone 02/15/2018 40* NEG mg/dL Final    Blood 02/15/2018 NEGATIVE   NEG   Final    Urobilinogen 02/15/2018 0.2  0.2 - 1.0 EU/dL Final    Nitrites 02/15/2018 NEGATIVE   NEG   Final    Leukocyte Esterase 02/15/2018 SMALL* NEG   Final    WBC 02/15/2018 0-4  0 - 4 /hpf Final    RBC 02/15/2018 0-5  0 - 5 /hpf Final    Epithelial cells 02/15/2018 FEW  FEW /lpf Final    Bacteria 02/15/2018 NEGATIVE   NEG /hpf Final    UA:UC IF INDICATED 02/15/2018 CULTURE NOT INDICATED BY UA RESULT  CNI   Final    Mucus 02/15/2018 1+* NEG /lpf Final    AMPHETAMINES 02/15/2018 NEGATIVE   NEG   Final    BARBITURATES 02/15/2018 NEGATIVE   NEG   Final    BENZODIAZEPINES 02/15/2018 NEGATIVE   NEG   Final    COCAINE 02/15/2018 NEGATIVE   NEG   Final    METHADONE 02/15/2018 NEGATIVE   NEG   Final    OPIATES 02/15/2018 NEGATIVE   NEG   Final    PCP(PHENCYCLIDINE) 02/15/2018 NEGATIVE   NEG   Final    THC (TH-CANNABINOL) 02/15/2018 POSITIVE* NEG   Final    Drug screen comment 02/15/2018 (NOTE)   Final    ALCOHOL(ETHYL),SERUM 02/15/2018 <10  <10 MG/DL Final    Bilirubin UA, confirm 02/15/2018 NEGATIVE   NEG   Final        RADIOLOGY REPORTS:  No results found for this or any previous visit. No results found.            MEDICATIONS       ALL MEDICATIONS  Current Facility-Administered Medications   Medication Dose Route Frequency    ziprasidone (GEODON) 20 mg in sterile water (preservative free) 1 mL injection  20 mg IntraMUSCular BID PRN    OLANZapine (ZyPREXA) tablet 5 mg  5 mg Oral Q6H PRN    benztropine (COGENTIN) tablet 2 mg  2 mg Oral BID PRN    benztropine (COGENTIN) injection 2 mg  2 mg IntraMUSCular BID PRN    LORazepam (ATIVAN) injection 2 mg  2 mg IntraMUSCular Q4H PRN    LORazepam (ATIVAN) tablet 1 mg  1 mg Oral Q4H PRN    zolpidem (AMBIEN) tablet 10 mg  10 mg Oral QHS PRN    acetaminophen (TYLENOL) tablet 650 mg  650 mg Oral Q4H PRN    ibuprofen (MOTRIN) tablet 400 mg  400 mg Oral Q8H PRN  magnesium hydroxide (MILK OF MAGNESIA) 400 mg/5 mL oral suspension 30 mL  30 mL Oral DAILY PRN    influenza vaccine 2017-18 (3 yrs+)(PF) (FLUZONE QUAD/FLUARIX QUAD) injection 0.5 mL  0.5 mL IntraMUSCular PRIOR TO DISCHARGE      SCHEDULED MEDICATIONS  Current Facility-Administered Medications   Medication Dose Route Frequency    influenza vaccine 2017-18 (3 yrs+)(PF) (FLUZONE QUAD/FLUARIX QUAD) injection 0.5 mL  0.5 mL IntraMUSCular PRIOR TO DISCHARGE                ASSESSMENT & PLAN        The patient, Ashly Pedraza, is a 23 y.o.  male who presents at this time for treatment of the following diagnoses:  Patient Active Hospital Problem List:   Psychosis (2/15/2018)    Assessment: hearing voices and paranoid     Plan: Zyprexa 5 mg po bed time    Anxiety (2/16/2018)    Assessment: depression and anxiety     Plan: lexapro 5 mg po daily           I will continue to monitor blood levels (Depakote, Tegretol, lithium, clozapine---a drug with a narrow therapeutic index= NTI) and associated labs for drug therapy implemented that require intense monitoring for toxicity as deemed appropriate based on current medication side effects and pharmacodynamically determined drug 1/2 lives. A coordinated, multidisplinary treatment team (includes the nurse, unit pharmcist,  and writer) round was conducted for this initial evaluation with the patient present. The following regarding medications was addressed during rounds with patient:   the risks and benefits of the proposed medication. The patient was given the opportunity to ask questions. Informed consent given to the use of the above medications. I will continue to adjust psychiatric and non-psychiatric medications (see above \"medication\" section and orders section for details) as deemed appropriate & based upon diagnoses and response to treatment.      I have reviewed admission (and previous/old) labs and medical tests in the EHR and or transferring hospital documents. I will continue to order blood tests/labs and diagnostic tests as deemed appropriate and review results as they become available (see orders for details). I have reviewed old psychiatric and medical records available in the EHR. I Will order additional psychiatric records from other institutions to further elucidate the nature of patient's psychopathology and review once available. I will gather additional collateral information from friends, family and o/p treatment team to further elucidate the nature of patient's psychopathology and baselline level of psychiatric functioning.       ESTIMATED LENGTH OF STAY:    3       STRENGTHS:  Exercising self-direction/Resourceful, Access to housing/residential stability and Interpersonal/supportive relationships (family, friends, peers)                                        SIGNED:    Manasa Maloney MD  2/16/2018

## 2018-02-16 NOTE — ED NOTES
Pts sister Rogers Vallecillo wanted her number charted because she is unsure if the patient knows her number [phone]

## 2018-02-16 NOTE — ROUTINE PROCESS
TRANSFER - OUT REPORT:    Verbal report given to Psych nurse(name) on Duyen Lyon  being transferred to 325(unit) for routine progression of care       Report consisted of patients Situation, Background, Assessment and   Recommendations(SBAR). Information from the following report(s) SBAR, Kardex and ED Summary was reviewed with the receiving nurse. Lines:       Opportunity for questions and clarification was provided.       Patient transported with:   Registered Nurse

## 2018-02-16 NOTE — ED NOTES
Pt arrived in ER with his mom and sister,Per family pt volunteer to admit himself as  psych pt,h/o anxiety,took Ativan 1 mg tab 1 hour ago on arrival.Denies hallucination,dellusion,HI/SI. Emergency Department Nursing Plan of Care       The Nursing Plan of Care is developed from the Nursing assessment and Emergency Department Attending provider initial evaluation. The plan of care may be reviewed in the ED Provider note.     The Plan of Care was developed with the following considerations:   Patient / Family readiness to learn indicated by:verbalized understanding  Persons(s) to be included in education: patient  Barriers to Learning/Limitations:No    Signed     Nicole Sanford RN    2/15/2018   7:02 PM

## 2018-02-16 NOTE — PROGRESS NOTES
Paged this evening by diet office regarding pt. Per diet office staff, pt didn't eat any of his meal and requested tube feeding instead. RD spoke at length with RN for pt. We both understand that pt is confused and is being offered his appropriate diet. We also noted during our conversation that it is against [facility] policy to order tube feeding/enteral support for a pt who is able to take nourishment po. Will follow and we will honor pt's preferences as we are able. We will offer Ensure Enlive supplement as often as he wants it. Hx unremarkable per nutrition. Double portions ordered to help meet pt's ~ energy and protein needs.    Ht: 6'3\"  Wt: 200 lb  BMIL 25 kg/(m^2) c/w normal weight  Est energy needs: 2480 kcal, 84 g protein, 1 mL/kcal fluids  Pt will consume > 75% of meals at follow up

## 2018-02-16 NOTE — BH NOTES
Pt was alert and oriented on the unit. Pt mood was very labile. He was witnessed having multiple crying spells throughout the shift. Pt paced the main and reported doing visualization to reduce anxiety. Pt was provided Ativan PO at 11am. Pt is paranoid and often thinks others are talking about him. Pt is intrusive and loose. Pt inquired about staff keys and a desire to get them and open the cabinets. Will continue to monitor for safety.

## 2018-02-16 NOTE — CONSULTS
Medical Consult for [facility] Patient    Consult H&P   dictated, see patient chart    Impression:    Sintia Hough a 23 y.o. male with past medical history of anxiety presents with behavioral health problems of: psychosis admitted for further psychiatric evaluation and treatment. Plan:   1. Psychiatry to manage mental health issues. 2. Medically stable at this time, will follow up as needed. 3. No VTE prophylaxis indicated or necessary at this time.      Thank you  Colby Montiel MD  2/16/2018, 3:16 PM

## 2018-02-16 NOTE — ROUTINE PROCESS
TRANSFER - OUT REPORT:    Verbal report given to Psych(name) on Jacqulyn Skiff  being transferred to 325(unit) for routine progression of care       Report consisted of patients Situation, Background, Assessment and   Recommendations(SBAR). Information from the following report(s) SBAR, Kardex and ED Summary was reviewed with the receiving nurse. Lines:       Opportunity for questions and clarification was provided.       Patient transported with:   Patients medications from home

## 2018-02-16 NOTE — BH NOTES
Pt admitted for increased anxiety and bizarre behaviors reported by family. Pt reports he smokes marijuana daily and drinks alcohol socially a couple times a week but amount differs. Pt does not have insight to link the paranoid/bizarre behaviors and increased anxiety to the drugs and alcohol. Pt reports restlessness and insomnia, does not remember the last time he slept more then 2 hours at a time. Pt was issued a TDO Wednesday at [facility], due to no bed availability pt was sent to [facility]. Thursday pt was discharged for not meeting criteria to be at [facility]. Pt's sister brought him to ED [facility] because bizarre statements and visual hallucinations that are uncharacteristic from pt. Pt's reason for coming to hospital changes slightly with each person who asks him. Pt is anxiously talking about getting  to his partner and needing the 4 things. Pt is homosexual, in shared room, supervisor (Augustus Alexander) made aware as no private rooms available. Augustus Alexander spoke to bed board and was advised there is no policy against sanders patients being in a room with someone else so long as they are not acting inappropriately. Pt is currently employed with [employer], refused to answer question about pending charges. Skin unremarkable. Pt asking for his wallet he signed into security at [facility] that he thought was being forwarded here. Pt advised that he should have received it before leaving [facility] as they would have no knowledge of him coming here after being discharged from there.  Will continue to monitor for safety, Q15.

## 2018-02-16 NOTE — BH NOTES
GROUP THERAPY PROGRESS NOTE    The patient Ashly norton 23 y.o. male is participating in [employer] Group. Group time: 1 hour    Personal goal for participation: To concentrate on selected task    Goal orientation: social    Group therapy participation: active    Therapeutic interventions reviewed and discussed: Crafts, games, music    Impression of participation: The patient was attentive.     Angus Scripture  2/16/2018  5:38 PM

## 2018-02-16 NOTE — ED PROVIDER NOTES
EMERGENCY DEPARTMENT HISTORY AND PHYSICAL EXAM    Date: 2/15/2018  Patient Name: Cuate Molina    History of Presenting Illness     Chief Complaint   Patient presents with    Mental Health Problem         History Provided By: Patient, Patient's Mother and Patient's Sister    HPI: Cuate Molina is a 23 y.o. male with anxiety who presents with mother and sister who states pt is here to be voluntarily admitted to behavioral health unit. Pt states he is here because \"I am trying to get  and I need four things. ..something blue, borrowed, old, new\"  Mother reports pt was TDO'd to [facility] on Wed but sat in ED for 2 days because the didn't have a bed. Apparently he was seen at [facility] and they determined that he did not need to be TDO'd and advised him to go to the hospital as a voluntary admission. Pt denies SI/HI. Mom reports this behavior is all new and apparently there was an \"episode\" prior to these symptoms beginning, mom also states they did a CT of head at [facility] that was normal. Pt states he doesn't remember them much, his thoughts are tangential and confusing. Pt admits to taking ativan about 1hr PTA. PCP: None        Past History     Past Medical History:  History reviewed. No pertinent past medical history. Past Surgical History:  History reviewed. No pertinent surgical history. Family History:  History reviewed. No pertinent family history.     Social History:  Social History   Substance Use Topics    Smoking status: Current Some Day Smoker    Smokeless tobacco: Current User    Alcohol use Yes       Allergies:  No Known Allergies      Review of Systems   Review of Systems   Unable to perform ROS: Psychiatric disorder       Physical Exam     Vitals:    02/16/18 0011 02/16/18 0138 02/16/18 0646 02/16/18 1515   BP: 136/85 (!) 149/119 140/84 (!) 157/93   Pulse: (!) 112 (!) 114 (!) 129 (!) 117   Resp: 18 18 20    Temp: 97.7 °F (36.5 °C) 97.8 °F (36.6 °C)  98.6 °F (37 °C) SpO2: 98%      Weight:  90.7 kg (200 lb)     Height:  6' 3\" (1.905 m)       Physical Exam   Constitutional: He is oriented to person, place, and time. He appears well-developed and well-nourished. No distress. HENT:   Head: Normocephalic and atraumatic. Mouth/Throat: Oropharynx is clear and moist. No oropharyngeal exudate. Eyes: Conjunctivae are normal.   Neck: Normal range of motion. Cardiovascular: Normal rate, regular rhythm and normal heart sounds. Pulmonary/Chest: Effort normal and breath sounds normal. No respiratory distress. He has no wheezes. He has no rales. Abdominal: Soft. Bowel sounds are normal.   Musculoskeletal: Normal range of motion. Neurological: He is alert and oriented to person, place, and time. Skin: Skin is warm and dry. Psychiatric: His affect is inappropriate. His speech is tangential. He is not hyperactive and not combative. Cognition and memory are impaired. He expresses inappropriate judgment. He expresses no homicidal and no suicidal ideation. He exhibits abnormal recent memory. Nursing note and vitals reviewed. Diagnostic Study Results     Labs -     No results found for this or any previous visit (from the past 12 hour(s)). Radiologic Studies -   No orders to display     CT Results  (Last 48 hours)    None        CXR Results  (Last 48 hours)    None            Medical Decision Making   I am the first provider for this patient. I reviewed the vital signs, available nursing notes, past medical history, past surgical history, family history and social history. Vital Signs-Reviewed the patient's vital signs. Records Reviewed: Nursing Notes, Old Medical Records, Previous Radiology Studies and Previous Laboratory Studies    ED Course:   8:36 PM  BIBI has seen and evaluated pt, awaiting for medical clearance and they will try and get pt our last general male bed available as long as he stays voluntary.     10:17 PM  Mom reports pt is becoming more agitated. Prescribed 1 mg ativan TID. Last one given 5 hours ago. 1 mg ordered. 12:01 AM  Spoke with Dr. Netta Zhang regarding pt's agitation after ativan. He recommended 5 mg of haldol and 25 mg benadryl. Disposition:  Pt admitted to inpatient psych unit at [facility]. Follow-up Information     None          Current Discharge Medication List          Provider Notes (Medical Decision Making):   DDX: psychosis, schizophrenia, bipolar    Procedures    Core Measures:    Critical Care Time:     For Hospitalized Patients:    1. Hospitalization Decision Time:  The decision to hospitalize the patient was made by Dr. John Carbajal at [facility] on 2/15/2018    2. Aspirin: not given    Diagnosis     Clinical Impression:   1.  Psychosis, unspecified psychosis type

## 2018-02-16 NOTE — PROGRESS NOTES
Problem: Psychosis  Goal: *STG/LTG: Complies with medication therapy  Outcome: Progressing Towards Goal  Pt has taken all medications as prescribed and accepting of PRN ativan PO at 11am.

## 2018-02-17 PROCEDURE — 74011250637 HC RX REV CODE- 250/637: Performed by: PSYCHIATRY & NEUROLOGY

## 2018-02-17 PROCEDURE — 65220000003 HC RM SEMIPRIVATE PSYCH

## 2018-02-17 PROCEDURE — 74011250637 HC RX REV CODE- 250/637

## 2018-02-17 RX ORDER — OLANZAPINE 5 MG/1
5 TABLET ORAL 2 TIMES DAILY
Status: DISCONTINUED | OUTPATIENT
Start: 2018-02-17 | End: 2018-02-19

## 2018-02-17 RX ADMIN — LORAZEPAM 1 MG: 1 TABLET ORAL at 07:46

## 2018-02-17 RX ADMIN — LORAZEPAM 1 MG: 1 TABLET ORAL at 20:29

## 2018-02-17 RX ADMIN — ACETAMINOPHEN 650 MG: 325 TABLET, FILM COATED ORAL at 03:05

## 2018-02-17 RX ADMIN — OLANZAPINE 5 MG: 5 TABLET, FILM COATED ORAL at 06:13

## 2018-02-17 RX ADMIN — ESCITALOPRAM OXALATE 5 MG: 10 TABLET ORAL at 07:46

## 2018-02-17 RX ADMIN — OLANZAPINE 5 MG: 5 TABLET, FILM COATED ORAL at 16:42

## 2018-02-17 NOTE — BH NOTES
Pt is displaying bizarre behavior, restless and flight of ideas. He is requesting double portion food while at the same time he is saying he needs IV food. Pt is quieter after the medication this evening but needs more improvement. Will continue to monitor q 15 for safety, mood and behavior changes.

## 2018-02-17 NOTE — BH NOTES
PSYCHIATRIC PROGRESS NOTE         Patient Name  Alejandro Eduardo   Date of Birth 1995   CSN [id number]   Medical Record Number  [id number]      Age  23 y.o. PCP None   Admit date:  2/15/2018    Room Number  325/02  @ [facility]   Date of Service  2/17/2018          PSYCHOTHERAPY SESSION NOTE:  Length of psychotherapy session: 15 minutes    Main condition/diagnosis/issues treated during session today, 2/17/2018 : mood and anxiety and coping skill s    I employed Cognitive Behavioral therapy techniques, Reality-Oriented psychotherapy, as well as supportive psychotherapy in regards to various ongoing psychosocial stressors, including the following: pre-admission and current problems; housing issues; occupational issues; academic issues; legal issues; medical issues; and stress of hospitalization. Interpersonal relationship issues and psychodynamic conflicts explored. Attempts made to alleviate maladaptive patterns. We, also, worked on issues of denial & effects of substance dependency/use     Overall, patient is not progressing    Treatment Plan Update (reviewed an updated 2/17/2018) : I will modify psychotherapy tx plan by implementing more stress management strategies, building upon cognitive behavioral techniques, increasing coping skills, as well as shoring up psychological defenses). An extended energy and skill set was needed to engage pt in psychotherapy due to some of the following: resistiveness, complexity, negativity, confrontational nature, hostile behaviors, and/or severe abnormalities in thought processes/psychosis resulting in the loss of expressive/receptive language communication skills. E & M PROGRESS NOTE:         HISTORY         HISTORY OF PRESENT ILLNESS/INTERVAL HISTORY:  (reviewed/updated 2/17/2018).   CC:  Pt admitted under a voluntary basis for severe depression with suicidal ideations  proving to be an imminent danger to self and an inability to care for self. HISTORY OF PRESENT ILLNESS:    The patient, Jacqulyn Skiff, is a 23 y.o. WHITE [place]  male with a past psychiatric history significant for depression , who presents at this time with complaints of (and/or evidence of) the following emotional symptoms: depression, suicidal thoughts/threats and anxiety. Additional symptomatology include anxiety, depression worse and difficulty sleeping. The above symptoms have been present for months . These symptoms are of moderate severity. These symptoms are intermittent/ fleeting in nature. The patient's condition has been precipitated by problems with family and psychosocial stressors (issues with relationship  ). Patient's condition made worse by continued illicit drug use and alcohol use as well as treatment noncompliance. UDS: positive ; BAL=0. He is feeling anxious and depressed and he has panic attacks and has inna feeling depressed and has no Ah or VH and no suicidal ideation       Jacqulyn Skiff presents/reports/evidences the following emotional symptoms today, 2/17/2018: depression, suicidal thoughts/threats and anxiety. The above symptoms have been present for . These symptoms are of moderate in severity. The symptoms are constant  in nature. Additional symptomatology and features includeanxiety, depression worse and difficulty sleeping proccupations . SIDE EFFECTS: (reviewed/updated 2/17/2018)  None reported or admitted to. No noted toxicity with use of Depakote/Tegretol/lithium/Clozaril/TCAs   ALLERGIES:(reviewed/updated 2/17/2018)  No Known Allergies   MEDICATIONS PRIOR TO ADMISSION:(reviewed/updated 2/17/2018)  Prescriptions Prior to Admission   Medication Sig    LORazepam (ATIVAN) 1 mg tablet Take 1 mg by mouth three (3) times daily.       PAST MEDICAL HISTORY: Past medical history from the initial psychiatric evaluation has been reviewed (reviewed/updated 2/17/2018) with no additional updates (I asked patient and no additional past medical history provided). History reviewed. No pertinent past medical history. History reviewed. No pertinent surgical history. SOCIAL HISTORY: Social history from the initial psychiatric evaluation has been reviewed (reviewed/updated 2/17/2018) with no additional updates (I asked patient and no additional social history provided). Social History     Social History    Marital status: SINGLE     Spouse name: N/A    Number of children: N/A    Years of education: N/A     Occupational History    Not on file. Social History Main Topics    Smoking status: Current Some Day Smoker    Smokeless tobacco: Current User    Alcohol use Yes    Drug use: Yes     Special: Marijuana    Sexual activity: Yes     Other Topics Concern    Not on file     Social History Narrative      FAMILY HISTORY: Family history from the initial psychiatric evaluation has been reviewed (reviewed/updated 2/17/2018) with no additional updates (I asked patient and no additional family history provided). History reviewed. No pertinent family history. REVIEW OF SYSTEMS: (reviewed/updated 2/17/2018)  Appetite:improved   Sleep: fitful   All other Review of Systems: Psychological ROS: positive for - anxiety and mood swings  Respiratory ROS: no cough, shortness of breath, or wheezing  Cardiovascular ROS: no chest pain or dyspnea on exertion         [facility] (MSE):    MSE FINDINGS ARE WITHIN NORMAL LIMITS (WNL) UNLESS OTHERWISE STATED BELOW. ( ALL OF THE BELOW CATEGORIES OF THE MSE HAVE BEEN REVIEWED (reviewed 2/17/2018) AND UPDATED AS DEEMED APPROPRIATE )  General Presentation age appropriate and casually dressed, cooperative   Orientation oriented to time, place and person   Vital Signs  See below (reviewed 2/17/2018); Vital Signs (BP, Pulse, & Temp) are within normal limits if not listed below.    Gait and Station Stable/steady, no ataxia   Musculoskeletal System No extrapyramidal symptoms (EPS); no abnormal muscular movements or Tardive Dyskinesia (TD); muscle strength and tone are within normal limits   Language No aphasia or dysarthria   Speech:  normal pitch and normal volume   Thought Processes logical; normal rate of thoughts; fair abstract reasoning/computation   Thought Associations goal directed   Thought Content free of delusions and free of hallucinations   Suicidal Ideations none   Homicidal Ideations none   Mood:  anxious    Affect:  anxious   Memory recent  good   Memory remote:  good   Concentration/Attention:  distractable   Fund of Knowledge average   Insight:  limited   Reliability fair   Judgment:  poor          VITALS:     Patient Vitals for the past 24 hrs:   Temp Pulse Resp BP   02/17/18 0548 97.6 °F (36.4 °C) 86 16 136/73   02/16/18 1515 98.6 °F (37 °C) (!) 117 - (!) 157/93     Wt Readings from Last 3 Encounters:   02/16/18 90.7 kg (200 lb)     Temp Readings from Last 3 Encounters:   02/17/18 97.6 °F (36.4 °C)     BP Readings from Last 3 Encounters:   02/17/18 136/73     Pulse Readings from Last 3 Encounters:   02/17/18 86            DATA     LABORATORY DATA:(reviewed/updated 2/17/2018)  No results found for this or any previous visit (from the past 24 hour(s)). No results found for: VALF2, VALAC, VALP, VALPR, DS6, CRBAM, CRBAMP, CARB2, XCRBAM  No results found for: LITHM   RADIOLOGY REPORTS:(reviewed/updated 2/17/2018)  No results found.        MEDICATIONS     ALL MEDICATIONS:   Current Facility-Administered Medications   Medication Dose Route Frequency    OLANZapine (ZyPREXA) tablet 5 mg  5 mg Oral BID    ziprasidone (GEODON) 20 mg in sterile water (preservative free) 1 mL injection  20 mg IntraMUSCular BID PRN    OLANZapine (ZyPREXA) tablet 5 mg  5 mg Oral Q6H PRN    benztropine (COGENTIN) tablet 2 mg  2 mg Oral BID PRN    benztropine (COGENTIN) injection 2 mg  2 mg IntraMUSCular BID PRN    LORazepam (ATIVAN) injection 2 mg  2 mg IntraMUSCular Q4H PRN    LORazepam (ATIVAN) tablet 1 mg  1 mg Oral Q4H PRN    zolpidem (AMBIEN) tablet 10 mg  10 mg Oral QHS PRN    acetaminophen (TYLENOL) tablet 650 mg  650 mg Oral Q4H PRN    ibuprofen (MOTRIN) tablet 400 mg  400 mg Oral Q8H PRN    magnesium hydroxide (MILK OF MAGNESIA) 400 mg/5 mL oral suspension 30 mL  30 mL Oral DAILY PRN    escitalopram oxalate (LEXAPRO) tablet 5 mg  5 mg Oral DAILY      SCHEDULED MEDICATIONS:   Current Facility-Administered Medications   Medication Dose Route Frequency    OLANZapine (ZyPREXA) tablet 5 mg  5 mg Oral BID    escitalopram oxalate (LEXAPRO) tablet 5 mg  5 mg Oral DAILY          ASSESSMENT & PLAN     DIAGNOSES REQUIRING ACTIVE TREATMENT AND MONITORING: (reviewed/updated 2/17/2018)  Patient Active Hospital Problem List:   Patient Active Hospital Problem List:   Psychosis (2/15/2018)    Assessment: hearing voices and paranoid     Plan: Zyprexa 5 mg po bid    Anxiety (2/16/2018)    Assessment: depression and anxiety     Plan: lexapro 5 mg po daily             I will continue to monitor blood levels (Depakote, Tegretol, lithium, clozapine---a drug with a narrow therapeutic index= NTI) and associated labs for drug therapy implemented that require intense monitoring for toxicity as deemed appropriate based on current medication side effects and pharmacodynamically determined drug 1/2 lives. In summary, Maribeth Valdez, is a 23 y.o.  male who presents with a severe exacerbation of the principal diagnosis of <principal problem not specified>  Patient's condition is worsening/not improving/not stable improving. Patient requires continued inpatient hospitalization for further stabilization, safety monitoring and medication management. I will continue to coordinate the provision of individual, milieu, occupational, group, and substance abuse therapies to address target symptoms/diagnoses as deemed appropriate for the individual patient.   A coordinated, multidisplinary treatment team round was conducted with the patient (this team consists of the nurse, psychiatric unit pharmcist,  and writer). Complete current electronic health record for patient has been reviewed today including consultant notes, ancillary staff notes, nurses and psychiatric tech notes. Suicide risk assessment completed and patient deemed to be of low risk for suicide at this time. The following regarding medications was addressed during rounds with patient:   the risks and benefits of the proposed medication. The patient was given the opportunity to ask questions. Informed consent given to the use of the above medications. Will continue to adjust psychiatric and non-psychiatric medications (see above \"medication\" section and orders section for details) as deemed appropriate & based upon diagnoses and response to treatment. I will continue to order blood tests/labs and diagnostic tests as deemed appropriate and review results as they become available (see orders for details and above listed lab/test results). I will order psychiatric records from previous [facility] hospitals to further elucidate the nature of patient's psychopathology and review once available. I will gather additional collateral information from friends, family and o/p treatment team to further elucidate the nature of patient's psychopathology and baselline level of psychiatric functioning. I certify that this patient's inpatient psychiatric hospital services furnished since the previous certification were, and continue to be, required for treatment that could reasonably be expected to improve the patient's condition, or for diagnostic study, and that the patient continues to need, on a daily basis, active treatment furnished directly by or requiring the supervision of inpatient psychiatric facility personnel.  In addition, the hospital records show that services furnished were intensive treatment services, admission or related services, or equivalent services.     EXPECTED DISCHARGE DATE/DAY: TBD     DISPOSITION: Home       Signed By:   Jamison Richard MD  2/17/2018

## 2018-02-17 NOTE — BH NOTES
Problem: Depressed Mood (Adult/Pediatric)  Goal: *STG: Complies with medication therapy  Outcome: Progressing Towards Goal  Pt is very visible in the milieu. Pt is restless and manic with scattered thought. Pt is intrusive, approaching the nursing station constantly. Pt is meds/meals compliant. Pt has participated in his treatment team meeting today. Pt complained of feeling anxious. Pt was given PRN for his anxiety and restless. Pt reported some relief. Will continue to monitor q 15 for safety, mood and behavior changes.

## 2018-02-17 NOTE — PROGRESS NOTES
Problem: Psychosis  Goal: *STG: Remains safe in hospital  Outcome: Progressing Towards Goal  Pt slept 6 hours. Pt had uneventful night and required no PRN's. Pt had no complaints or signs of distress throughout night. Respirations were unlabored. Continuing to monitor with q15 min rounds for safety.

## 2018-02-17 NOTE — PROGRESS NOTES
Laboratory monitoring for mood stabilizer and antipsychotics:    Recommended baseline monitoring has been completed based on this patient's current medication regimen. The following labs have been ordered to complete baseline monitoring:N/A     The following labs were completed at transferring facility: N/A     The patient is currently taking the following medication(s):   Current Facility-Administered Medications   Medication Dose Route Frequency    OLANZapine (ZyPREXA) tablet 10 mg  10 mg Oral BID    famotidine (PEPCID) tablet 20 mg  20 mg Oral DAILY    escitalopram oxalate (LEXAPRO) tablet 5 mg  5 mg Oral DAILY       Serum Drug Level(s)  N/A    Height, Weight, BMI Estimation  Estimated body mass index is 25 kg/(m^2) as calculated from the following:    Height as of this encounter: 190.5 cm (75\"). Weight as of this encounter: 90.7 kg (200 lb). Renal Function, Hepatic Function and Chemistry  Estimated Creatinine Clearance: 122.6 mL/min (based on Cr of 1.11). Lab Results   Component Value Date/Time    Sodium 139 02/15/2018 08:00 PM    Potassium 3.6 02/15/2018 08:00 PM    Chloride 102 02/15/2018 08:00 PM    CO2 21 02/15/2018 08:00 PM    Anion gap 16 (H) 02/15/2018 08:00 PM    Glucose 89 02/19/2018 04:15 AM    BUN 17 02/15/2018 08:00 PM    Creatinine 1.11 02/15/2018 08:00 PM    BUN/Creatinine ratio 15 02/15/2018 08:00 PM    GFR est AA >60 02/15/2018 08:00 PM    GFR est non-AA >60 02/15/2018 08:00 PM    Calcium 9.7 02/15/2018 08:00 PM    ALT (SGPT) 45 02/15/2018 08:00 PM    AST (SGOT) 27 02/15/2018 08:00 PM    Alk.  phosphatase 57 02/15/2018 08:00 PM    Protein, total 8.0 02/15/2018 08:00 PM    Albumin 4.4 02/15/2018 08:00 PM    Globulin 3.6 02/15/2018 08:00 PM    A-G Ratio 1.2 02/15/2018 08:00 PM    Bilirubin, total 0.6 02/15/2018 08:00 PM       Lab Results   Component Value Date/Time    Glucose 89 02/19/2018 04:15 AM       No results found for: HBA1C, HGBE8, XWU0DVIN    Hematology  Lab Results   Component Value Date/Time    WBC 13.5 (H) 02/15/2018 08:00 PM    HGB 15.6 02/15/2018 08:00 PM    HCT 44.8 02/15/2018 08:00 PM    PLATELET 553 (H) 91/76/4607 08:00 PM    MCV 85.5 02/15/2018 08:00 PM       Lipids  Lab Results   Component Value Date/Time    Cholesterol, total 130 02/19/2018 04:15 AM    HDL Cholesterol 31 02/19/2018 04:15 AM    LDL, calculated 43.8 02/19/2018 04:15 AM    Triglyceride 276 (H) 02/19/2018 04:15 AM    CHOL/HDL Ratio 4.2 02/19/2018 04:15 AM       Thyroid Function    Lab Results   Component Value Date/Time    TSH 0.95 02/19/2018 04:15 AM     Vitals  Visit Vitals    /73    Pulse 76    Temp 97.4 °F (36.3 °C)    Resp 16    Ht 190.5 cm (75\")    Wt 90.7 kg (200 lb)    SpO2 98%    BMI 25 kg/m2       Rimma Avila PHARMD, BCPS  Contact: [phone]

## 2018-02-17 NOTE — PROGRESS NOTES
Problem: Psychosis  Goal: *STG: Decreased delusional thinking  Outcome: Progressing Towards Goal  Pt is alert/oriented x4. Calm and Cooperative. He is a little anxious and intrusive at times. However he is appropriate in the milieu. Attending groups. Mood is good. Meds/meal compliant. PRN Deidre Plain given this shift. No behavioral issues. He denies SI/HI and denies A/V hallucinations. Will continue to monitor pt with q15 min rounds for safety.

## 2018-02-18 PROCEDURE — 74011250637 HC RX REV CODE- 250/637: Performed by: PSYCHIATRY & NEUROLOGY

## 2018-02-18 PROCEDURE — 74011250637 HC RX REV CODE- 250/637

## 2018-02-18 PROCEDURE — 65220000003 HC RM SEMIPRIVATE PSYCH

## 2018-02-18 RX ADMIN — MAGNESIUM HYDROXIDE 30 ML: 400 SUSPENSION ORAL at 08:44

## 2018-02-18 RX ADMIN — OLANZAPINE 5 MG: 5 TABLET, FILM COATED ORAL at 17:09

## 2018-02-18 RX ADMIN — ZOLPIDEM TARTRATE 10 MG: 10 TABLET ORAL at 06:28

## 2018-02-18 RX ADMIN — LORAZEPAM 1 MG: 1 TABLET ORAL at 08:43

## 2018-02-18 RX ADMIN — OLANZAPINE 5 MG: 5 TABLET, FILM COATED ORAL at 08:43

## 2018-02-18 RX ADMIN — ESCITALOPRAM OXALATE 5 MG: 10 TABLET ORAL at 08:43

## 2018-02-18 NOTE — CONSULTS
[address] [place]    Rebekah Cooper  MR#: [id number]  : 1995  ACCOUNT #: [de-identified]   DATE OF SERVICE: 2018    REFERRING PHYSICIAN:  Essie Casillas MD     CHIEF COMPLAINT:  Psychosis. HISTORY OF PRESENT ILLNESS:  This is a 22-year-ol male presents to psychiatry requiring further psychiatric evaluation and treatment. Denies any chest pain, shortness of breath, nausea, vomiting, diarrhea, change in bowel or bladder habits. Does not have a primary care physician. PAST MEDICAL HISTORY:  Anxiety and panic disorder. PAST SURGICAL HISTORY:  [place] tooth extraction. ALLERGIES:  NONE. MEDICATIONS:  Ativan 1 mg t.i.d.    SOCIAL HISTORY:  Does smoke cigarettes, rarely drinks alcohol. Does smoke marijuana. Denies any cocaine or heroin use. Single, has no kids. Works for [employer]. PHYSICAL EXAMINATION:  VITAL SIGNS:  Temperature is 97.8, blood pressure 140/84, pulse 129, respirations 20, weight is 200 pounds. GENERAL:  Pleasant, but anxious. HEENT:  Oropharynx is clear. NECK:  Supple. LUNGS:  Clear to auscultation. No wheeze, rales, or rhonchi. No increase work of breathing. CARDIOVASCULAR:  Regular rate. No murmurs, gallops, or rubs. ABDOMEN:  Soft, nontender, nondistended. Normoactive bowel sounds, no hepatosplenomegaly. EXTREMITIES:  No cyanosis, clubbing, edema. LABORATORY DATA:  Alcohol level less than 10. Hemoglobin is 15.6, hematocrit is 44.0. UA was negative. Sodium 139, potassium 3.6, chloride 102, bicarbonate 21, BUN 17, creatinine is 1.11, glucose 117. Tox screen was positive for marijuana. ASSESSMENT:  This is a 18-year-old male with past medical history of anxiety and substance abuse, presents with psychosis, admitted for further psychiatric evaluation and treatment. PLAN:    1. Psychiatry management of mental health issues. 2.  Psychiatry to manage any substance withdrawal symptoms. 3.  Medically stable.   Follow up on a p.r.n. basis. 4.  No VTE prophylactic indicated or warranted at this time. Thank you for this consult.       Miguel Horner MD DC / [place]  D: 02/17/2018 19:36     T: 02/17/2018 22:53  JOB #: [id number]

## 2018-02-18 NOTE — BH NOTES
PSYCHIATRIC PROGRESS NOTE         Patient Name  Jacqulyn Skiff   Date of Birth 1995   [facility] [id number]   Medical Record Number  [id number]      Age  23 y.o. PCP None   Admit date:  2/15/2018    Room Number  325/02  @ [facility] (formerly [facility])   Date of Service  2/18/2018          PSYCHOTHERAPY SESSION NOTE:  Length of psychotherapy session: 15 minutes    Main condition/diagnosis/issues treated during session today, 2/18/2018 : mood and anxiety and coping skill s    I employed Cognitive Behavioral therapy techniques, Reality-Oriented psychotherapy, as well as supportive psychotherapy in regards to various ongoing psychosocial stressors, including the following: pre-admission and current problems; housing issues; occupational issues; academic issues; legal issues; medical issues; and stress of hospitalization. Interpersonal relationship issues and psychodynamic conflicts explored. Attempts made to alleviate maladaptive patterns. We, also, worked on issues of denial & effects of substance dependency/use     Overall, patient is not progressing    Treatment Plan Update (reviewed an updated 2/18/2018) : I will modify psychotherapy tx plan by implementing more stress management strategies, building upon cognitive behavioral techniques, increasing coping skills, as well as shoring up psychological defenses). An extended energy and skill set was needed to engage pt in psychotherapy due to some of the following: resistiveness, complexity, negativity, confrontational nature, hostile behaviors, and/or severe abnormalities in thought processes/psychosis resulting in the loss of expressive/receptive language communication skills. E & M PROGRESS NOTE:         HISTORY         HISTORY OF PRESENT ILLNESS/INTERVAL HISTORY:  (reviewed/updated 2/18/2018).   CC:  Pt admitted under a voluntary basis for severe depression with suicidal ideations  proving to be an imminent danger to self and an inability to care for self. HISTORY OF PRESENT ILLNESS:    The patient, Kiki Root, is a 23 y.o. WHITE [place]  male with a past psychiatric history significant for depression , who presents at this time with complaints of (and/or evidence of) the following emotional symptoms: depression, suicidal thoughts/threats and anxiety. Additional symptomatology include anxiety, depression worse and difficulty sleeping. The above symptoms have been present for months . These symptoms are of moderate severity. These symptoms are intermittent/ fleeting in nature. The patient's condition has been precipitated by problems with family and psychosocial stressors (issues with relationship  ). Patient's condition made worse by continued illicit drug use and alcohol use as well as treatment noncompliance. UDS: positive ; BAL=0. He is feeling anxious and depressed and he has panic attacks and has inna feeling depressed and has no Ah or VH and no suicidal ideation       Kiki Root presents/reports/evidences the following emotional symptoms today, 2/18/2018: depression, suicidal thoughts/threats and anxiety. The above symptoms have been present for . These symptoms are of moderate in severity. The symptoms are constant  in nature. Additional symptomatology and features includeanxiety, depression worse and difficulty sleeping proccupations . 02/18/18: Patient still remain anxious, preoccupied, says with increase dose of Zyprexa he feels little better,repeatedly asking same questions,limited insight. Compliant with medications, denies SI/HI/AH      SIDE EFFECTS: (reviewed/updated 2/18/2018)  None reported or admitted to. No noted toxicity with use of Depakote/Tegretol/lithium/Clozaril/TCAs   ALLERGIES:(reviewed/updated 2/18/2018)  No Known Allergies   MEDICATIONS PRIOR TO ADMISSION:(reviewed/updated 2/18/2018)  Prescriptions Prior to Admission   Medication Sig    LORazepam (ATIVAN) 1 mg tablet Take 1 mg by mouth three (3) times daily. PAST MEDICAL HISTORY: Past medical history from the initial psychiatric evaluation has been reviewed (reviewed/updated 2/18/2018) with no additional updates (I asked patient and no additional past medical history provided). History reviewed. No pertinent past medical history. History reviewed. No pertinent surgical history. SOCIAL HISTORY: Social history from the initial psychiatric evaluation has been reviewed (reviewed/updated 2/18/2018) with no additional updates (I asked patient and no additional social history provided). Social History     Social History    Marital status: SINGLE     Spouse name: N/A    Number of children: N/A    Years of education: N/A     Occupational History    Not on file. Social History Main Topics    Smoking status: Current Some Day Smoker    Smokeless tobacco: Current User    Alcohol use Yes    Drug use: Yes     Special: Marijuana    Sexual activity: Yes     Other Topics Concern    Not on file     Social History Narrative      FAMILY HISTORY: Family history from the initial psychiatric evaluation has been reviewed (reviewed/updated 2/18/2018) with no additional updates (I asked patient and no additional family history provided). History reviewed. No pertinent family history.     REVIEW OF SYSTEMS: (reviewed/updated 2/18/2018)  Appetite:improved   Sleep: fitful   All other Review of Systems: Psychological ROS: positive for - anxiety and mood swings  Respiratory ROS: no cough, shortness of breath, or wheezing  Cardiovascular ROS: no chest pain or dyspnea on exertion         [facility] ([facility] [place]    MSE FINDINGS ARE WITHIN NORMAL LIMITS (WNL) UNLESS OTHERWISE STATED BELOW. ( ALL OF THE BELOW CATEGORIES OF THE MSE HAVE BEEN REVIEWED (reviewed 2/18/2018) AND UPDATED AS DEEMED APPROPRIATE )  General Presentation age appropriate and casually dressed, cooperative   Orientation oriented to time, place and person   Vital Signs  See below (reviewed 2/18/2018); Vital Signs (BP, Pulse, & Temp) are within normal limits if not listed below. Gait and Station Stable/steady, no ataxia   Musculoskeletal System No extrapyramidal symptoms (EPS); no abnormal muscular movements or Tardive Dyskinesia (TD); muscle strength and tone are within normal limits   Language No aphasia or dysarthria   Speech:  normal pitch and normal volume   Thought Processes logical; normal rate of thoughts; fair abstract reasoning/computation   Thought Associations goal directed   Thought Content free of delusions and free of hallucinations   Suicidal Ideations none   Homicidal Ideations none   Mood:  anxious    Affect:  anxious   Memory recent  good   Memory remote:  good   Concentration/Attention:  distractable   Fund of Knowledge average   Insight:  limited   Reliability fair   Judgment:  poor          VITALS:     Patient Vitals for the past 24 hrs:   Temp Pulse Resp BP   02/18/18 0554 98.6 °F (37 °C) 86 16 139/75     Wt Readings from Last 3 Encounters:   02/16/18 90.7 kg (200 lb)     Temp Readings from Last 3 Encounters:   02/18/18 98.6 °F (37 °C)     BP Readings from Last 3 Encounters:   02/18/18 139/75     Pulse Readings from Last 3 Encounters:   02/18/18 86            DATA     LABORATORY DATA:(reviewed/updated 2/18/2018)  No results found for this or any previous visit (from the past 24 hour(s)). No results found for: VALF2, VALAC, VALP, VALPR, DS6, CRBAM, CRBAMP, CARB2, XCRBAM  No results found for: LITHM   RADIOLOGY REPORTS:(reviewed/updated 2/18/2018)  No results found.        MEDICATIONS     ALL MEDICATIONS:   Current Facility-Administered Medications   Medication Dose Route Frequency    OLANZapine (ZyPREXA) tablet 5 mg  5 mg Oral BID    ziprasidone (GEODON) 20 mg in sterile water (preservative free) 1 mL injection  20 mg IntraMUSCular BID PRN    OLANZapine (ZyPREXA) tablet 5 mg  5 mg Oral Q6H PRN    benztropine (COGENTIN) tablet 2 mg  2 mg Oral BID PRN    benztropine (COGENTIN) injection 2 mg  2 mg IntraMUSCular BID PRN    LORazepam (ATIVAN) injection 2 mg  2 mg IntraMUSCular Q4H PRN    LORazepam (ATIVAN) tablet 1 mg  1 mg Oral Q4H PRN    zolpidem (AMBIEN) tablet 10 mg  10 mg Oral QHS PRN    acetaminophen (TYLENOL) tablet 650 mg  650 mg Oral Q4H PRN    ibuprofen (MOTRIN) tablet 400 mg  400 mg Oral Q8H PRN    magnesium hydroxide (MILK OF MAGNESIA) 400 mg/5 mL oral suspension 30 mL  30 mL Oral DAILY PRN    escitalopram oxalate (LEXAPRO) tablet 5 mg  5 mg Oral DAILY      SCHEDULED MEDICATIONS:   Current Facility-Administered Medications   Medication Dose Route Frequency    OLANZapine (ZyPREXA) tablet 5 mg  5 mg Oral BID    escitalopram oxalate (LEXAPRO) tablet 5 mg  5 mg Oral DAILY          ASSESSMENT & PLAN     DIAGNOSES REQUIRING ACTIVE TREATMENT AND MONITORING: (reviewed/updated 2/18/2018)  Patient Active Hospital Problem List:   Patient Active Hospital Problem List:   Psychosis (2/15/2018)    Assessment: hearing voices and paranoid     Plan: Zyprexa 5 mg po bid   02/18/18:Continue current treatment. Anxiety (2/16/2018)    Assessment: depression and anxiety     Plan: lexapro 5 mg po daily             I will continue to monitor blood levels (Depakote, Tegretol, lithium, clozapine---a drug with a narrow therapeutic index= NTI) and associated labs for drug therapy implemented that require intense monitoring for toxicity as deemed appropriate based on current medication side effects and pharmacodynamically determined drug 1/2 lives. In summary, Slime Roe, is a 23 y.o.  male who presents with a severe exacerbation of the principal diagnosis of <principal problem not specified>  Patient's condition is worsening/not improving/not stable improving. Patient requires continued inpatient hospitalization for further stabilization, safety monitoring and medication management.   I will continue to coordinate the provision of individual, milieu, occupational, group, and substance abuse therapies to address target symptoms/diagnoses as deemed appropriate for the individual patient. A coordinated, multidisplinary treatment team round was conducted with the patient (this team consists of the nurse, psychiatric unit pharmcist,  and writer). Complete current electronic health record for patient has been reviewed today including consultant notes, ancillary staff notes, nurses and psychiatric tech notes. Suicide risk assessment completed and patient deemed to be of low risk for suicide at this time. The following regarding medications was addressed during rounds with patient:   the risks and benefits of the proposed medication. The patient was given the opportunity to ask questions. Informed consent given to the use of the above medications. Will continue to adjust psychiatric and non-psychiatric medications (see above \"medication\" section and orders section for details) as deemed appropriate & based upon diagnoses and response to treatment. I will continue to order blood tests/labs and diagnostic tests as deemed appropriate and review results as they become available (see orders for details and above listed lab/test results). I will order psychiatric records from previous [facility] hospitals to further elucidate the nature of patient's psychopathology and review once available. I will gather additional collateral information from friends, family and o/p treatment team to further elucidate the nature of patient's psychopathology and baselline level of psychiatric functioning.          I certify that this patient's inpatient psychiatric hospital services furnished since the previous certification were, and continue to be, required for treatment that could reasonably be expected to improve the patient's condition, or for diagnostic study, and that the patient continues to need, on a daily basis, active treatment furnished directly by or requiring the supervision of inpatient psychiatric facility personnel. In addition, the hospital records show that services furnished were intensive treatment services, admission or related services, or equivalent services.     EXPECTED DISCHARGE DATE/DAY: TBD     DISPOSITION: Home       Signed By:   Mateusz Benavides MD  2/18/2018

## 2018-02-18 NOTE — BH NOTES
Problem: Depressed Mood (Adult/Pediatric)  Goal: *STG: Complies with medication therapy  Outcome: Progressing Towards Goal  Pt is very visible in the milieu. Pt is restless and manic with scattered thought. Pt is intrusive, approaching the nursing station constantly for random demands. Pt is meds/meals compliant. Pt has participated in his treatment team meeting today. Pt complained of feeling anxious. Pt was given PRN for his anxiety and restless. Pt reported some relief. Will continue to monitor q 15 for safety, mood and behavior changes.

## 2018-02-18 NOTE — BH NOTES
REFLECTIONS GROUP THERAPY PROGRESS NOTE    The patient Albino Ernst is participating in Reflections Group Therapy. Group time: 30 minutes    Personal goal for participation: Share with group about feelings and concerns throughout the course of the day. Goal orientation: personal    Group therapy participation: active    Therapeutic interventions reviewed and discussed: Yes    Impression of participation:   Positive input.     Alexandrea Munoz  2/18/2018

## 2018-02-18 NOTE — PROGRESS NOTES
Problem: Psychosis  Goal: *STG: Remains safe in hospital  Outcome: Progressing Towards Goal  Pt slept 2 hours. Pt had uneventful night and required no PRN's. Pt had no complaints or signs of distress throughout night. Respirations were unlabored. Continuing to monitor with q15 min rounds for safety.

## 2018-02-18 NOTE — PROGRESS NOTES
Problem: Psychosis  Goal: *STG/LTG: Complies with medication therapy  Outcome: Progressing Towards Goal  Pt is alert/oriented x4. Pt less intrusive and less manic this shift. Appropriate in the milieu. Attending groups. Mood is good. Meds/meal compliant. PRN ativan given this shift. No behavioral issues. Denies SI/HI and denies A/V hallucinations. Will continue to monitor pt with q15 min rounds for safety.

## 2018-02-19 LAB
CHOLEST SERPL-MCNC: 130 MG/DL
GLUCOSE P FAST SERPL-MCNC: 89 MG/DL (ref 65–100)
HDLC SERPL-MCNC: 31 MG/DL
HDLC SERPL: 4.2 {RATIO} (ref 0–5)
LDLC SERPL CALC-MCNC: 43.8 MG/DL (ref 0–100)
LIPID PROFILE,FLP: ABNORMAL
TRIGL SERPL-MCNC: 276 MG/DL (ref ?–150)
TSH SERPL DL<=0.05 MIU/L-ACNC: 0.95 UIU/ML (ref 0.36–3.74)
VLDLC SERPL CALC-MCNC: 55.2 MG/DL

## 2018-02-19 PROCEDURE — 74011250637 HC RX REV CODE- 250/637: Performed by: PSYCHIATRY & NEUROLOGY

## 2018-02-19 PROCEDURE — 82947 ASSAY GLUCOSE BLOOD QUANT: CPT

## 2018-02-19 PROCEDURE — 65220000003 HC RM SEMIPRIVATE PSYCH

## 2018-02-19 PROCEDURE — 84443 ASSAY THYROID STIM HORMONE: CPT

## 2018-02-19 PROCEDURE — 74011250637 HC RX REV CODE- 250/637

## 2018-02-19 PROCEDURE — 36415 COLL VENOUS BLD VENIPUNCTURE: CPT

## 2018-02-19 PROCEDURE — 80061 LIPID PANEL: CPT

## 2018-02-19 RX ORDER — LORAZEPAM 0.5 MG/1
0.5 TABLET ORAL
Status: DISCONTINUED | OUTPATIENT
Start: 2018-02-19 | End: 2018-02-22 | Stop reason: HOSPADM

## 2018-02-19 RX ORDER — OLANZAPINE 10 MG/1
10 TABLET ORAL 2 TIMES DAILY
Status: DISCONTINUED | OUTPATIENT
Start: 2018-02-19 | End: 2018-02-19

## 2018-02-19 RX ADMIN — OLANZAPINE 7.5 MG: 2.5 TABLET, FILM COATED ORAL at 17:35

## 2018-02-19 RX ADMIN — LORAZEPAM 1 MG: 1 TABLET ORAL at 05:18

## 2018-02-19 RX ADMIN — OLANZAPINE 5 MG: 5 TABLET, FILM COATED ORAL at 08:58

## 2018-02-19 RX ADMIN — ESCITALOPRAM OXALATE 5 MG: 10 TABLET ORAL at 08:57

## 2018-02-19 NOTE — BH NOTES
The patient is very intrusive and somatic focusing on whatever he can find wrong with himself. He is visible on the unit and in the milieu. The patient has to be redirected whenever he is intrusive. Safety checks Q 15 minutes.

## 2018-02-19 NOTE — BH NOTES
PSYCHIATRIC PROGRESS NOTE         Patient Name  Estela Rinne   Date of Birth 1995   [facility] [id number]   Medical Record Number  [id number]      Age  23 y.o. PCP None   Admit date:  2/15/2018    Room Number  325/02  @ [facility]   Date of Service  2/19/2018          PSYCHOTHERAPY SESSION NOTE:  Length of psychotherapy session: 15 minutes    Main condition/diagnosis/issues treated during session today, 2/19/2018 : mood and anxiety and coping skill s    I employed Cognitive Behavioral therapy techniques, Reality-Oriented psychotherapy, as well as supportive psychotherapy in regards to various ongoing psychosocial stressors, including the following: pre-admission and current problems; housing issues; occupational issues; academic issues; legal issues; medical issues; and stress of hospitalization. Interpersonal relationship issues and psychodynamic conflicts explored. Attempts made to alleviate maladaptive patterns. We, also, worked on issues of denial & effects of substance dependency/use     Overall, patient is not progressing    Treatment Plan Update (reviewed an updated 2/19/2018) : I will modify psychotherapy tx plan by implementing more stress management strategies, building upon cognitive behavioral techniques, increasing coping skills, as well as shoring up psychological defenses). An extended energy and skill set was needed to engage pt in psychotherapy due to some of the following: resistiveness, complexity, negativity, confrontational nature, hostile behaviors, and/or severe abnormalities in thought processes/psychosis resulting in the loss of expressive/receptive language communication skills. E & M PROGRESS NOTE:         HISTORY         HISTORY OF PRESENT ILLNESS/INTERVAL HISTORY:  (reviewed/updated 2/19/2018).   CC:  Pt admitted under a voluntary basis for severe depression with suicidal ideations  proving to be an imminent danger to self and an inability to care for self. HISTORY OF PRESENT ILLNESS:    The patient, Jo Lantigua, is a 23 y.o. WHITE [place]  male with a past psychiatric history significant for depression , who presents at this time with complaints of (and/or evidence of) the following emotional symptoms: depression, suicidal thoughts/threats and anxiety. Additional symptomatology include anxiety, depression worse and difficulty sleeping. The above symptoms have been present for months . These symptoms are of moderate severity. These symptoms are intermittent/ fleeting in nature. The patient's condition has been precipitated by problems with family and psychosocial stressors (issues with relationship  ). Patient's condition made worse by continued illicit drug use and alcohol use as well as treatment noncompliance. UDS: positive ; BAL=0. He is feeling anxious and depressed and he has panic attacks and has inna feeling depressed and has no Ah or VH and no suicidal ideation       Jo Lantigua presents/reports/evidences the following emotional symptoms today, 2/19/2018: depression, suicidal thoughts/threats and anxiety. The above symptoms have been present for . These symptoms are of moderate in severity. The symptoms are constant  in nature. Additional symptomatology and features includeanxiety, depression worse and difficulty sleeping proccupations . 02/18/18: Patient still remain anxious, preoccupied, says with increase dose of Zyprexa he feels little better,repeatedly asking same questions,limited insight. Compliant with medications, denies SI/HI/AH  2/19/18 he is doing better and mood is better and no anger issues and no Si or HI and has no aggression and NO Si or HI and less paranoid and he is more bright today       SIDE EFFECTS: (reviewed/updated 2/19/2018)  None reported or admitted to. No noted toxicity with use of Depakote/Tegretol/lithium/Clozaril/TCAs   ALLERGIES:(reviewed/updated 2/19/2018)  No Known Allergies MEDICATIONS PRIOR TO ADMISSION:(reviewed/updated 2/19/2018)  Prescriptions Prior to Admission   Medication Sig    LORazepam (ATIVAN) 1 mg tablet Take 1 mg by mouth three (3) times daily. PAST MEDICAL HISTORY: Past medical history from the initial psychiatric evaluation has been reviewed (reviewed/updated 2/19/2018) with no additional updates (I asked patient and no additional past medical history provided). History reviewed. No pertinent past medical history. History reviewed. No pertinent surgical history. SOCIAL HISTORY: Social history from the initial psychiatric evaluation has been reviewed (reviewed/updated 2/19/2018) with no additional updates (I asked patient and no additional social history provided). Social History     Social History    Marital status: SINGLE     Spouse name: N/A    Number of children: N/A    Years of education: N/A     Occupational History    Not on file. Social History Main Topics    Smoking status: Current Some Day Smoker    Smokeless tobacco: Current User    Alcohol use Yes    Drug use: Yes     Special: Marijuana    Sexual activity: Yes     Other Topics Concern    Not on file     Social History Narrative      FAMILY HISTORY: Family history from the initial psychiatric evaluation has been reviewed (reviewed/updated 2/19/2018) with no additional updates (I asked patient and no additional family history provided). History reviewed. No pertinent family history.     REVIEW OF SYSTEMS: (reviewed/updated 2/19/2018)  Appetite:improved   Sleep: fitful   All other Review of Systems: Psychological ROS: positive for - anxiety and mood swings  Respiratory ROS: no cough, shortness of breath, or wheezing  Cardiovascular ROS: no chest pain or dyspnea on exertion         [facility] (MSE):    MSE FINDINGS ARE WITHIN NORMAL LIMITS (WNL) UNLESS OTHERWISE STATED BELOW. ( ALL OF THE BELOW CATEGORIES OF THE MSE HAVE BEEN REVIEWED (reviewed 2/19/2018) AND UPDATED AS DEEMED APPROPRIATE )  General Presentation age appropriate and casually dressed, cooperative   Orientation oriented to time, place and person   Vital Signs  See below (reviewed 2/19/2018); Vital Signs (BP, Pulse, & Temp) are within normal limits if not listed below.    Gait and Station Stable/steady, no ataxia   Musculoskeletal System No extrapyramidal symptoms (EPS); no abnormal muscular movements or Tardive Dyskinesia (TD); muscle strength and tone are within normal limits   Language No aphasia or dysarthria   Speech:  normal pitch and normal volume   Thought Processes logical; normal rate of thoughts; fair abstract reasoning/computation   Thought Associations goal directed   Thought Content free of delusions and free of hallucinations   Suicidal Ideations none   Homicidal Ideations none   Mood:  anxious    Affect:  anxious   Memory recent  good   Memory remote:  good   Concentration/Attention:  distractable   Fund of Knowledge average   Insight:  limited   Reliability fair   Judgment:  poor          VITALS:     Patient Vitals for the past 24 hrs:   Temp Pulse Resp BP   02/19/18 0645 97.5 °F (36.4 °C) 85 18 161/77     Wt Readings from Last 3 Encounters:   02/16/18 90.7 kg (200 lb)     Temp Readings from Last 3 Encounters:   02/19/18 97.5 °F (36.4 °C)     BP Readings from Last 3 Encounters:   02/19/18 161/77     Pulse Readings from Last 3 Encounters:   02/19/18 85            DATA     LABORATORY DATA:(reviewed/updated 2/19/2018)  Recent Results (from the past 24 hour(s))   TSH 3RD GENERATION    Collection Time: 02/19/18  4:15 AM   Result Value Ref Range    TSH 0.95 0.36 - 3.74 uIU/mL   LIPID PANEL    Collection Time: 02/19/18  4:15 AM   Result Value Ref Range    LIPID PROFILE          Cholesterol, total 130 <200 MG/DL    Triglyceride 276 (H) <150 MG/DL    HDL Cholesterol 31 MG/DL    LDL, calculated 43.8 0 - 100 MG/DL    VLDL, calculated 55.2 MG/DL    CHOL/HDL Ratio 4.2 0 - 5.0     GLUCOSE, FASTING    Collection Time: 02/19/18  4:15 AM   Result Value Ref Range    Glucose 89 65 - 100 MG/DL     No results found for: VALF2, VALAC, VALP, VALPR, DS6, CRBAM, CRBAMP, CARB2, XCRBAM  No results found for: LITHM   RADIOLOGY REPORTS:(reviewed/updated 2/19/2018)  No results found. MEDICATIONS     ALL MEDICATIONS:   Current Facility-Administered Medications   Medication Dose Route Frequency    OLANZapine (ZyPREXA) tablet 5 mg  5 mg Oral BID    ziprasidone (GEODON) 20 mg in sterile water (preservative free) 1 mL injection  20 mg IntraMUSCular BID PRN    OLANZapine (ZyPREXA) tablet 5 mg  5 mg Oral Q6H PRN    benztropine (COGENTIN) tablet 2 mg  2 mg Oral BID PRN    benztropine (COGENTIN) injection 2 mg  2 mg IntraMUSCular BID PRN    LORazepam (ATIVAN) injection 2 mg  2 mg IntraMUSCular Q4H PRN    LORazepam (ATIVAN) tablet 1 mg  1 mg Oral Q4H PRN    zolpidem (AMBIEN) tablet 10 mg  10 mg Oral QHS PRN    acetaminophen (TYLENOL) tablet 650 mg  650 mg Oral Q4H PRN    ibuprofen (MOTRIN) tablet 400 mg  400 mg Oral Q8H PRN    magnesium hydroxide (MILK OF MAGNESIA) 400 mg/5 mL oral suspension 30 mL  30 mL Oral DAILY PRN    escitalopram oxalate (LEXAPRO) tablet 5 mg  5 mg Oral DAILY      SCHEDULED MEDICATIONS:   Current Facility-Administered Medications   Medication Dose Route Frequency    OLANZapine (ZyPREXA) tablet 5 mg  5 mg Oral BID    escitalopram oxalate (LEXAPRO) tablet 5 mg  5 mg Oral DAILY          ASSESSMENT & PLAN     DIAGNOSES REQUIRING ACTIVE TREATMENT AND MONITORING: (reviewed/updated 2/19/2018)  Patient Active Hospital Problem List:   Patient Active Hospital Problem List:   Psychosis (2/15/2018)    Assessment: hearing voices and paranoid     Plan: Zyprexa 5 mg po bid   02/18/18:Continue current treatment.    Anxiety (2/16/2018)    Assessment: depression and anxiety     Plan: lexapro 5 mg po daily   2/19/18 increase Zyprexa to 7.5 mg bid             I will continue to monitor blood levels (Depakote, Tegretol, lithium, clozapine---a drug with a narrow therapeutic index= NTI) and associated labs for drug therapy implemented that require intense monitoring for toxicity as deemed appropriate based on current medication side effects and pharmacodynamically determined drug 1/2 lives. In summary, Kiki Root, is a 23 y.o.  male who presents with a severe exacerbation of the principal diagnosis of <principal problem not specified>  Patient's condition is worsening/not improving/not stable improving. Patient requires continued inpatient hospitalization for further stabilization, safety monitoring and medication management. I will continue to coordinate the provision of individual, milieu, occupational, group, and substance abuse therapies to address target symptoms/diagnoses as deemed appropriate for the individual patient. A coordinated, multidisplinary treatment team round was conducted with the patient (this team consists of the nurse, psychiatric unit pharmcist,  and writer). Complete current electronic health record for patient has been reviewed today including consultant notes, ancillary staff notes, nurses and psychiatric tech notes. Suicide risk assessment completed and patient deemed to be of low risk for suicide at this time. The following regarding medications was addressed during rounds with patient:   the risks and benefits of the proposed medication. The patient was given the opportunity to ask questions. Informed consent given to the use of the above medications. Will continue to adjust psychiatric and non-psychiatric medications (see above \"medication\" section and orders section for details) as deemed appropriate & based upon diagnoses and response to treatment. I will continue to order blood tests/labs and diagnostic tests as deemed appropriate and review results as they become available (see orders for details and above listed lab/test results).     I will order psychiatric records from previous [facility] hospitals to further elucidate the nature of patient's psychopathology and review once available. I will gather additional collateral information from friends, family and o/p treatment team to further elucidate the nature of patient's psychopathology and baselline level of psychiatric functioning. I certify that this patient's inpatient psychiatric hospital services furnished since the previous certification were, and continue to be, required for treatment that could reasonably be expected to improve the patient's condition, or for diagnostic study, and that the patient continues to need, on a daily basis, active treatment furnished directly by or requiring the supervision of inpatient psychiatric facility personnel. In addition, the hospital records show that services furnished were intensive treatment services, admission or related services, or equivalent services.     EXPECTED DISCHARGE DATE/DAY: TBD     DISPOSITION: Home       Signed By:   Wilman Lopez MD  2/19/2018

## 2018-02-19 NOTE — BH NOTES
GROUP THERAPY PROGRESS NOTE    The patient Leopoldo Bryant a 23 y.o. male is participating in Coping Skills Group. Group time: 45 minutes    Personal goal for participation: To participate in happiness game    Goal orientation:  personal    Group therapy participation: active    Therapeutic interventions reviewed and discussed: life scenarios exploring the pursuit of happiness    Impression of participation:  The patient was attentive.     Dima Isidro  2/19/2018  2:29 PM

## 2018-02-19 NOTE — PROGRESS NOTES
Problem: Psychosis  Goal: *STG: Remains safe in hospital  Outcome: Progressing Towards Goal  Pt is alert/oriented x4. Calm and Cooperative. Appropriate in the milieu. Attending groups. Mood is good. Meds/meal compliant. No PRNs given this shift. No issues. Will continue to monitor pt with q15 min rounds for safety.

## 2018-02-19 NOTE — BH NOTES
Social Work     Clinician spoke with the pt's mother Yakov Toro [phone] and discussed the pt's medications and plan for after-care. Ms. Regalado Chatom plans to take some time off of work to ensure that the pt has everything he needs after discharge. She expressed that the pt is improving but doesn't feel he is ready for discharge. Clinician met with the pt face to face he presented was a labile mood, extremely tearful. He stated he felt like he was ready to be discharged despite making a statement about feeling paranoid, he feels like everything that he has seen in the hospital is somehow related to him. He was focused on how others are feeling with him being in the hospital instead of focusing on his treatment.

## 2018-02-19 NOTE — BH NOTES
Pt observed resting comfortably in bed with eyes closed, breathing even and unlabored. No s/s of distress noted, no complaints voiced. Pt slept 4 hours, will continue to monitor for safety, Q15.     Labs collected    Ativan requested for anxiety around 5:15am

## 2018-02-20 PROCEDURE — 65220000003 HC RM SEMIPRIVATE PSYCH

## 2018-02-20 PROCEDURE — 74011250637 HC RX REV CODE- 250/637: Performed by: PSYCHIATRY & NEUROLOGY

## 2018-02-20 RX ORDER — OLANZAPINE 10 MG/1
10 TABLET ORAL 2 TIMES DAILY
Status: DISCONTINUED | OUTPATIENT
Start: 2018-02-20 | End: 2018-02-22 | Stop reason: HOSPADM

## 2018-02-20 RX ORDER — FAMOTIDINE 20 MG/1
20 TABLET, FILM COATED ORAL DAILY
Status: DISCONTINUED | OUTPATIENT
Start: 2018-02-20 | End: 2018-02-22 | Stop reason: HOSPADM

## 2018-02-20 RX ORDER — FAMOTIDINE 20 MG/1
40 TABLET, FILM COATED ORAL DAILY
Status: DISCONTINUED | OUTPATIENT
Start: 2018-02-20 | End: 2018-02-20 | Stop reason: DRUGHIGH

## 2018-02-20 RX ADMIN — ZOLPIDEM TARTRATE 10 MG: 10 TABLET ORAL at 21:08

## 2018-02-20 RX ADMIN — OLANZAPINE 10 MG: 10 TABLET, FILM COATED ORAL at 17:15

## 2018-02-20 RX ADMIN — FAMOTIDINE 20 MG: 20 TABLET ORAL at 09:17

## 2018-02-20 RX ADMIN — LORAZEPAM 0.5 MG: 0.5 TABLET ORAL at 05:09

## 2018-02-20 RX ADMIN — OLANZAPINE 10 MG: 10 TABLET, FILM COATED ORAL at 09:17

## 2018-02-20 RX ADMIN — ESCITALOPRAM OXALATE 5 MG: 10 TABLET ORAL at 09:17

## 2018-02-20 NOTE — BH NOTES
GROUP THERAPY PROGRESS NOTE    The patient Abdi norton 23 y.o. male is participating in Coping Skills Group. Group time: 45 minutes    Personal goal for participation: To identify positive coping strategies a-z    Goal orientation:  personal    Group therapy participation: active    Therapeutic interventions reviewed and discussed: worksheet    Impression of participation:  The patient was attentive.     Xiomara Tinajero  2/20/2018  1:55 PM

## 2018-02-20 NOTE — BH NOTES
The patient still has to be reminded not to be intrusive in other patients affairs and concentrate on himself. He is meal and medication compliant attends the group and participate. The patient states that he is always helping people that's his nature. Monitoring for safety q 15 minutes.

## 2018-02-20 NOTE — BH NOTES
PSYCHIATRIC PROGRESS NOTE         Patient Name  Duyen Lyon   Date of Birth 1995   [facility] [id number]   Medical Record Number  [id number]      Age  23 y.o. PCP None   Admit date:  2/15/2018    Room Number  325/02  @ [facility]   Date of Service  2/20/2018          PSYCHOTHERAPY SESSION NOTE:  Length of psychotherapy session: 15 minutes    Main condition/diagnosis/issues treated during session today, 2/20/2018 : mood and anxiety and coping skill s    I employed Cognitive Behavioral therapy techniques, Reality-Oriented psychotherapy, as well as supportive psychotherapy in regards to various ongoing psychosocial stressors, including the following: pre-admission and current problems; housing issues; occupational issues; academic issues; legal issues; medical issues; and stress of hospitalization. Interpersonal relationship issues and psychodynamic conflicts explored. Attempts made to alleviate maladaptive patterns. We, also, worked on issues of denial & effects of substance dependency/use     Overall, patient is not progressing    Treatment Plan Update (reviewed an updated 2/20/2018) : I will modify psychotherapy tx plan by implementing more stress management strategies, building upon cognitive behavioral techniques, increasing coping skills, as well as shoring up psychological defenses). An extended energy and skill set was needed to engage pt in psychotherapy due to some of the following: resistiveness, complexity, negativity, confrontational nature, hostile behaviors, and/or severe abnormalities in thought processes/psychosis resulting in the loss of expressive/receptive language communication skills. E & M PROGRESS NOTE:         HISTORY         HISTORY OF PRESENT ILLNESS/INTERVAL HISTORY:  (reviewed/updated 2/20/2018).   CC:  Pt admitted under a voluntary basis for severe depression with suicidal ideations  proving to be an imminent danger to self and an inability to care for self. HISTORY OF PRESENT ILLNESS:    The patient, Jo Lantigua, is a 23 y.o. WHITE [place]  male with a past psychiatric history significant for depression , who presents at this time with complaints of (and/or evidence of) the following emotional symptoms: depression, suicidal thoughts/threats and anxiety. Additional symptomatology include anxiety, depression worse and difficulty sleeping. The above symptoms have been present for months . These symptoms are of moderate severity. These symptoms are intermittent/ fleeting in nature. The patient's condition has been precipitated by problems with family and psychosocial stressors (issues with relationship  ). Patient's condition made worse by continued illicit drug use and alcohol use as well as treatment noncompliance. UDS: positive ; BAL=0. He is feeling anxious and depressed and he has panic attacks and has inna feeling depressed and has no Ah or VH and no suicidal ideation       oJ Lantigua presents/reports/evidences the following emotional symptoms today, 2/20/2018: depression, suicidal thoughts/threats and anxiety. The above symptoms have been present for . These symptoms are of moderate in severity. The symptoms are constant  in nature. Additional symptomatology and features includeanxiety, depression worse and difficulty sleeping proccupations . 02/18/18: Patient still remain anxious, preoccupied, says with increase dose of Zyprexa he feels little better,repeatedly asking same questions,limited insight. Compliant with medications, denies SI/HI/AH  2/19/18 he is doing better and mood is better and no anger issues and no Si or HI and has no aggression and NO Si or HI and less paranoid and he is more bright today   2/20/18 he is still not sleeping well and not feeling sad or depressed and still feels anxious and still has pressured speech and he is less paranoid       SIDE EFFECTS: (reviewed/updated 2/20/2018)  None reported or admitted to. No noted toxicity with use of Depakote/Tegretol/lithium/Clozaril/TCAs   ALLERGIES:(reviewed/updated 2/20/2018)  No Known Allergies   MEDICATIONS PRIOR TO ADMISSION:(reviewed/updated 2/20/2018)  Prescriptions Prior to Admission   Medication Sig    LORazepam (ATIVAN) 1 mg tablet Take 1 mg by mouth three (3) times daily. PAST MEDICAL HISTORY: Past medical history from the initial psychiatric evaluation has been reviewed (reviewed/updated 2/20/2018) with no additional updates (I asked patient and no additional past medical history provided). History reviewed. No pertinent past medical history. History reviewed. No pertinent surgical history. SOCIAL HISTORY: Social history from the initial psychiatric evaluation has been reviewed (reviewed/updated 2/20/2018) with no additional updates (I asked patient and no additional social history provided). Social History     Social History    Marital status: SINGLE     Spouse name: N/A    Number of children: N/A    Years of education: N/A     Occupational History    Not on file. Social History Main Topics    Smoking status: Current Some Day Smoker    Smokeless tobacco: Current User    Alcohol use Yes    Drug use: Yes     Special: Marijuana    Sexual activity: Yes     Other Topics Concern    Not on file     Social History Narrative      FAMILY HISTORY: Family history from the initial psychiatric evaluation has been reviewed (reviewed/updated 2/20/2018) with no additional updates (I asked patient and no additional family history provided). History reviewed. No pertinent family history.     REVIEW OF SYSTEMS: (reviewed/updated 2/20/2018)  Appetite:improved   Sleep: fitful   All other Review of Systems: Psychological ROS: positive for - anxiety and mood swings  Respiratory ROS: no cough, shortness of breath, or wheezing  Cardiovascular ROS: no chest pain or dyspnea on exertion         [facility] ([facility]): MSE FINDINGS ARE WITHIN NORMAL LIMITS (WNL) UNLESS OTHERWISE STATED BELOW. ( ALL OF THE BELOW CATEGORIES OF THE MSE HAVE BEEN REVIEWED (reviewed 2/20/2018) AND UPDATED AS DEEMED APPROPRIATE )  General Presentation age appropriate and casually dressed, cooperative   Orientation oriented to time, place and person   Vital Signs  See below (reviewed 2/20/2018); Vital Signs (BP, Pulse, & Temp) are within normal limits if not listed below. Gait and Station Stable/steady, no ataxia   Musculoskeletal System No extrapyramidal symptoms (EPS); no abnormal muscular movements or Tardive Dyskinesia (TD); muscle strength and tone are within normal limits   Language No aphasia or dysarthria   Speech:  normal pitch and normal volume   Thought Processes logical; normal rate of thoughts; fair abstract reasoning/computation   Thought Associations goal directed   Thought Content free of delusions and free of hallucinations   Suicidal Ideations none   Homicidal Ideations none   Mood:  anxious    Affect:  anxious   Memory recent  good   Memory remote:  good   Concentration/Attention:  distractable   Fund of Knowledge average   Insight:  limited   Reliability fair   Judgment:  poor          VITALS:     Patient Vitals for the past 24 hrs:   Temp Pulse Resp BP   02/20/18 0645 97.4 °F (36.3 °C) 76 16 124/73     Wt Readings from Last 3 Encounters:   02/16/18 90.7 kg (200 lb)     Temp Readings from Last 3 Encounters:   02/20/18 97.4 °F (36.3 °C)     BP Readings from Last 3 Encounters:   02/20/18 124/73     Pulse Readings from Last 3 Encounters:   02/20/18 76            DATA     LABORATORY DATA:(reviewed/updated 2/20/2018)  No results found for this or any previous visit (from the past 24 hour(s)). No results found for: VALF2, VALAC, VALP, VALPR, DS6, CRBAM, CRBAMP, CARB2, XCRBAM  No results found for: LITHM   RADIOLOGY REPORTS:(reviewed/updated 2/20/2018)  No results found.        MEDICATIONS     ALL MEDICATIONS:   Current Facility-Administered Medications   Medication Dose Route Frequency    LORazepam (ATIVAN) tablet 0.5 mg  0.5 mg Oral Q6H PRN    OLANZapine (ZyPREXA) tablet 7.5 mg  7.5 mg Oral BID    ziprasidone (GEODON) 20 mg in sterile water (preservative free) 1 mL injection  20 mg IntraMUSCular BID PRN    OLANZapine (ZyPREXA) tablet 5 mg  5 mg Oral Q6H PRN    benztropine (COGENTIN) tablet 2 mg  2 mg Oral BID PRN    benztropine (COGENTIN) injection 2 mg  2 mg IntraMUSCular BID PRN    LORazepam (ATIVAN) injection 2 mg  2 mg IntraMUSCular Q4H PRN    zolpidem (AMBIEN) tablet 10 mg  10 mg Oral QHS PRN    acetaminophen (TYLENOL) tablet 650 mg  650 mg Oral Q4H PRN    ibuprofen (MOTRIN) tablet 400 mg  400 mg Oral Q8H PRN    magnesium hydroxide (MILK OF MAGNESIA) 400 mg/5 mL oral suspension 30 mL  30 mL Oral DAILY PRN    escitalopram oxalate (LEXAPRO) tablet 5 mg  5 mg Oral DAILY      SCHEDULED MEDICATIONS:   Current Facility-Administered Medications   Medication Dose Route Frequency    OLANZapine (ZyPREXA) tablet 7.5 mg  7.5 mg Oral BID    escitalopram oxalate (LEXAPRO) tablet 5 mg  5 mg Oral DAILY          ASSESSMENT & PLAN     DIAGNOSES REQUIRING ACTIVE TREATMENT AND MONITORING: (reviewed/updated 2/20/2018)  Patient Active Hospital Problem List:   Patient Active Hospital Problem List:   Psychosis (2/15/2018)    Assessment: hearing voices and paranoid     Plan: Zyprexa 5 mg po bid   02/18/18:Continue current treatment.    Anxiety (2/16/2018)    Assessment: depression and anxiety     Plan: lexapro 5 mg po daily   2/19/18 increase Zyprexa to 7.5 mg bid   2/20/18 increase Zyprexa to 20 mg             I will continue to monitor blood levels (Depakote, Tegretol, lithium, clozapine---a drug with a narrow therapeutic index= NTI) and associated labs for drug therapy implemented that require intense monitoring for toxicity as deemed appropriate based on current medication side effects and pharmacodynamically determined drug 1/2 lives. In summary, Deepti Bowen, is a 23 y.o.  male who presents with a severe exacerbation of the principal diagnosis of <principal problem not specified>  Patient's condition is worsening/not improving/not stable improving. Patient requires continued inpatient hospitalization for further stabilization, safety monitoring and medication management. I will continue to coordinate the provision of individual, milieu, occupational, group, and substance abuse therapies to address target symptoms/diagnoses as deemed appropriate for the individual patient. A coordinated, multidisplinary treatment team round was conducted with the patient (this team consists of the nurse, psychiatric unit pharmcist,  and writer). Complete current electronic health record for patient has been reviewed today including consultant notes, ancillary staff notes, nurses and psychiatric tech notes. Suicide risk assessment completed and patient deemed to be of low risk for suicide at this time. The following regarding medications was addressed during rounds with patient:   the risks and benefits of the proposed medication. The patient was given the opportunity to ask questions. Informed consent given to the use of the above medications. Will continue to adjust psychiatric and non-psychiatric medications (see above \"medication\" section and orders section for details) as deemed appropriate & based upon diagnoses and response to treatment. I will continue to order blood tests/labs and diagnostic tests as deemed appropriate and review results as they become available (see orders for details and above listed lab/test results). I will order psychiatric records from previous [facility] hospitals to further elucidate the nature of patient's psychopathology and review once available.     I will gather additional collateral information from friends, family and o/p treatment team to further elucidate the nature of patient's psychopathology and baselline level of psychiatric functioning. I certify that this patient's inpatient psychiatric hospital services furnished since the previous certification were, and continue to be, required for treatment that could reasonably be expected to improve the patient's condition, or for diagnostic study, and that the patient continues to need, on a daily basis, active treatment furnished directly by or requiring the supervision of inpatient psychiatric facility personnel. In addition, the hospital records show that services furnished were intensive treatment services, admission or related services, or equivalent services.     EXPECTED DISCHARGE DATE/DAY: TBD     DISPOSITION: Home       Signed By:   Deejay Kurtz MD  2/20/2018

## 2018-02-20 NOTE — PROGRESS NOTES
Problem: Psychosis  Goal: *STG: Remains safe in hospital  Pt out and visible on unit, interacting with peers and staff. Pt makes good eye contact. A&O x 4. Affect is blunted, mood is generally pleasant. Hygiene is adequate, independent in ADLs. Gait is steady. Sleep and appetite patterns WNL. Attending some groups with participation. No evidence of anxiety. Denies SI/HI. Denies hallucinations at present. Verbally contracts for safety with good eye contact. Pt encouraged to continue to participate in care. Will continue to monitor pt with q 15 min checks.

## 2018-02-20 NOTE — BH NOTES
Pt observed resting comfortably in bed with eyes closed, breathing even and unlabored. No s/s of distress noted, no complaints voiced.  Pt slept 3 hours, will continue to monitor for safety, Q15.

## 2018-02-21 LAB
ERYTHROCYTE [DISTWIDTH] IN BLOOD BY AUTOMATED COUNT: 12.8 % (ref 11.5–14.5)
HCT VFR BLD AUTO: 41.6 % (ref 36.6–50.3)
HGB BLD-MCNC: 14 G/DL (ref 12.1–17)
MCH RBC QN AUTO: 29.6 PG (ref 26–34)
MCHC RBC AUTO-ENTMCNC: 33.7 G/DL (ref 30–36.5)
MCV RBC AUTO: 87.9 FL (ref 80–99)
NRBC # BLD: 0 K/UL (ref 0–0.01)
NRBC BLD-RTO: 0 PER 100 WBC
PLATELET # BLD AUTO: 399 K/UL (ref 150–400)
PMV BLD AUTO: 9.6 FL (ref 8.9–12.9)
RBC # BLD AUTO: 4.73 M/UL (ref 4.1–5.7)
WBC # BLD AUTO: 7.8 K/UL (ref 4.1–11.1)

## 2018-02-21 PROCEDURE — 65220000003 HC RM SEMIPRIVATE PSYCH

## 2018-02-21 PROCEDURE — 36415 COLL VENOUS BLD VENIPUNCTURE: CPT | Performed by: PSYCHIATRY & NEUROLOGY

## 2018-02-21 PROCEDURE — 85027 COMPLETE CBC AUTOMATED: CPT | Performed by: PSYCHIATRY & NEUROLOGY

## 2018-02-21 PROCEDURE — 74011250637 HC RX REV CODE- 250/637: Performed by: PSYCHIATRY & NEUROLOGY

## 2018-02-21 RX ADMIN — OLANZAPINE 10 MG: 10 TABLET, FILM COATED ORAL at 07:20

## 2018-02-21 RX ADMIN — FAMOTIDINE 20 MG: 20 TABLET ORAL at 07:21

## 2018-02-21 RX ADMIN — OLANZAPINE 10 MG: 10 TABLET, FILM COATED ORAL at 16:50

## 2018-02-21 RX ADMIN — ESCITALOPRAM OXALATE 5 MG: 10 TABLET ORAL at 07:21

## 2018-02-21 NOTE — BH NOTES
Pt observed resting comfortably in bed with eyes closed, breathing even and unlabored. No s/s of distress noted, no complaints voiced.  Pt slept 5 hours, will continue to monitor for safety, Q15.

## 2018-02-21 NOTE — BH NOTES
PSYCHIATRIC PROGRESS NOTE         Patient Name  Cuate    Date of Birth 1995   [place] [id number]   Medical Record Number  [id number]      Age  23 y.o. PCP None   Admit date:  2/15/2018    Room Number  325/02  @ [facility]   Date of Service  2/21/2018          PSYCHOTHERAPY SESSION NOTE:  Length of psychotherapy session: 15 minutes    Main condition/diagnosis/issues treated during session today, 2/21/2018 : mood and anxiety and coping skill s    I employed Cognitive Behavioral therapy techniques, Reality-Oriented psychotherapy, as well as supportive psychotherapy in regards to various ongoing psychosocial stressors, including the following: pre-admission and current problems; housing issues; occupational issues; academic issues; legal issues; medical issues; and stress of hospitalization. Interpersonal relationship issues and psychodynamic conflicts explored. Attempts made to alleviate maladaptive patterns. We, also, worked on issues of denial & effects of substance dependency/use     Overall, patient is not progressing    Treatment Plan Update (reviewed an updated 2/21/2018) : I will modify psychotherapy tx plan by implementing more stress management strategies, building upon cognitive behavioral techniques, increasing coping skills, as well as shoring up psychological defenses). An extended energy and skill set was needed to engage pt in psychotherapy due to some of the following: resistiveness, complexity, negativity, confrontational nature, hostile behaviors, and/or severe abnormalities in thought processes/psychosis resulting in the loss of expressive/receptive language communication skills. E & M PROGRESS NOTE:         HISTORY         HISTORY OF PRESENT ILLNESS/INTERVAL HISTORY:  (reviewed/updated 2/21/2018).   CC:  Pt admitted under a voluntary basis for severe depression with suicidal ideations  proving to be an imminent danger to self and an inability to care for self. HISTORY OF PRESENT ILLNESS:    The patient, Jagdish Cordoba, is a 23 y.o. WHITE [place]  male with a past psychiatric history significant for depression , who presents at this time with complaints of (and/or evidence of) the following emotional symptoms: depression, suicidal thoughts/threats and anxiety. Additional symptomatology include anxiety, depression worse and difficulty sleeping. The above symptoms have been present for months . These symptoms are of moderate severity. These symptoms are intermittent/ fleeting in nature. The patient's condition has been precipitated by problems with family and psychosocial stressors (issues with relationship  ). Patient's condition made worse by continued illicit drug use and alcohol use as well as treatment noncompliance. UDS: positive ; BAL=0. He is feeling anxious and depressed and he has panic attacks and has inna feeling depressed and has no Ah or VH and no suicidal ideation       Jagdish Cordoba presents/reports/evidences the following emotional symptoms today, 2/21/2018: depression, suicidal thoughts/threats and anxiety. The above symptoms have been present for . These symptoms are of moderate in severity. The symptoms are constant  in nature. Additional symptomatology and features includeanxiety, depression worse and difficulty sleeping proccupations . 02/18/18: Patient still remain anxious, preoccupied, says with increase dose of Zyprexa he feels little better,repeatedly asking same questions,limited insight. Compliant with medications, denies SI/HI/AH  2/19/18 he is doing better and mood is better and no anger issues and no Si or HI and has no aggression and NO Si or HI and less paranoid and he is more bright today   2/20/18 he is still not sleeping well and not feeling sad or depressed and still feels anxious and still has pressured speech and he is less paranoid   2/21/18 he is sleeping better and compliant with his medications and no anger issues and he is not paranoid and less anxious and he is engaging well in talking and  No acting out behavior       SIDE EFFECTS: (reviewed/updated 2/21/2018)  None reported or admitted to. No noted toxicity with use of Depakote/Tegretol/lithium/Clozaril/TCAs   ALLERGIES:(reviewed/updated 2/21/2018)  No Known Allergies   MEDICATIONS PRIOR TO ADMISSION:(reviewed/updated 2/21/2018)  Prescriptions Prior to Admission   Medication Sig    LORazepam (ATIVAN) 1 mg tablet Take 1 mg by mouth three (3) times daily. PAST MEDICAL HISTORY: Past medical history from the initial psychiatric evaluation has been reviewed (reviewed/updated 2/21/2018) with no additional updates (I asked patient and no additional past medical history provided). History reviewed. No pertinent past medical history. History reviewed. No pertinent surgical history. SOCIAL HISTORY: Social history from the initial psychiatric evaluation has been reviewed (reviewed/updated 2/21/2018) with no additional updates (I asked patient and no additional social history provided). Social History     Social History    Marital status: SINGLE     Spouse name: N/A    Number of children: N/A    Years of education: N/A     Occupational History    Not on file. Social History Main Topics    Smoking status: Current Some Day Smoker    Smokeless tobacco: Current User    Alcohol use Yes    Drug use: Yes     Special: Marijuana    Sexual activity: Yes     Other Topics Concern    Not on file     Social History Narrative      FAMILY HISTORY: Family history from the initial psychiatric evaluation has been reviewed (reviewed/updated 2/21/2018) with no additional updates (I asked patient and no additional family history provided). History reviewed. No pertinent family history.     REVIEW OF SYSTEMS: (reviewed/updated 2/21/2018)  Appetite:improved   Sleep: fitful   All other Review of Systems: Psychological ROS: positive for - anxiety and mood swings  Respiratory ROS: no cough, shortness of breath, or wheezing  Cardiovascular ROS: no chest pain or dyspnea on exertion         [facility] (MSE):    MSE FINDINGS ARE WITHIN NORMAL LIMITS (WNL) UNLESS OTHERWISE STATED BELOW. ( ALL OF THE BELOW CATEGORIES OF THE MSE HAVE BEEN REVIEWED (reviewed 2/21/2018) AND UPDATED AS DEEMED APPROPRIATE )  General Presentation age appropriate and casually dressed, cooperative   Orientation oriented to time, place and person   Vital Signs  See below (reviewed 2/21/2018); Vital Signs (BP, Pulse, & Temp) are within normal limits if not listed below. Gait and Station Stable/steady, no ataxia   Musculoskeletal System No extrapyramidal symptoms (EPS); no abnormal muscular movements or Tardive Dyskinesia (TD); muscle strength and tone are within normal limits   Language No aphasia or dysarthria   Speech:  normal pitch and normal volume   Thought Processes logical; normal rate of thoughts; fair abstract reasoning/computation   Thought Associations goal directed   Thought Content free of delusions and free of hallucinations   Suicidal Ideations none   Homicidal Ideations none   Mood:  anxious    Affect:  anxious   Memory recent  good   Memory remote:  good   Concentration/Attention:  distractable   Fund of Knowledge average   Insight:  limited   Reliability fair   Judgment:  poor          VITALS:     Patient Vitals for the past 24 hrs:   Temp Pulse Resp BP   02/21/18 0715 97.8 °F (36.6 °C) 80 18 153/87     Wt Readings from Last 3 Encounters:   02/16/18 90.7 kg (200 lb)     Temp Readings from Last 3 Encounters:   02/21/18 97.8 °F (36.6 °C)     BP Readings from Last 3 Encounters:   02/21/18 153/87     Pulse Readings from Last 3 Encounters:   02/21/18 80            DATA     LABORATORY DATA:(reviewed/updated 2/21/2018)  No results found for this or any previous visit (from the past 24 hour(s)).   No results found for: VALF2, VALAC, VALP, VALPR, DS6, CRBAM, CRBAMP, CARB2, XCRBAM  No results found for: LITHM   RADIOLOGY REPORTS:(reviewed/updated 2/21/2018)  No results found. MEDICATIONS     ALL MEDICATIONS:   Current Facility-Administered Medications   Medication Dose Route Frequency    OLANZapine (ZyPREXA) tablet 10 mg  10 mg Oral BID    famotidine (PEPCID) tablet 20 mg  20 mg Oral DAILY    LORazepam (ATIVAN) tablet 0.5 mg  0.5 mg Oral Q6H PRN    ziprasidone (GEODON) 20 mg in sterile water (preservative free) 1 mL injection  20 mg IntraMUSCular BID PRN    OLANZapine (ZyPREXA) tablet 5 mg  5 mg Oral Q6H PRN    benztropine (COGENTIN) tablet 2 mg  2 mg Oral BID PRN    benztropine (COGENTIN) injection 2 mg  2 mg IntraMUSCular BID PRN    LORazepam (ATIVAN) injection 2 mg  2 mg IntraMUSCular Q4H PRN    zolpidem (AMBIEN) tablet 10 mg  10 mg Oral QHS PRN    acetaminophen (TYLENOL) tablet 650 mg  650 mg Oral Q4H PRN    ibuprofen (MOTRIN) tablet 400 mg  400 mg Oral Q8H PRN    magnesium hydroxide (MILK OF MAGNESIA) 400 mg/5 mL oral suspension 30 mL  30 mL Oral DAILY PRN    escitalopram oxalate (LEXAPRO) tablet 5 mg  5 mg Oral DAILY      SCHEDULED MEDICATIONS:   Current Facility-Administered Medications   Medication Dose Route Frequency    OLANZapine (ZyPREXA) tablet 10 mg  10 mg Oral BID    famotidine (PEPCID) tablet 20 mg  20 mg Oral DAILY    escitalopram oxalate (LEXAPRO) tablet 5 mg  5 mg Oral DAILY          ASSESSMENT & PLAN     DIAGNOSES REQUIRING ACTIVE TREATMENT AND MONITORING: (reviewed/updated 2/21/2018)  Patient Active Hospital Problem List:   Patient Active Hospital Problem List:   Psychosis (2/15/2018)    Assessment: hearing voices and paranoid     Plan: Zyprexa 5 mg po bid   02/18/18:Continue current treatment.    Anxiety (2/16/2018)    Assessment: depression and anxiety     Plan: lexapro 5 mg po daily   2/19/18 increase Zyprexa to 7.5 mg bid   2/20/18 increase Zyprexa to 20 mg   2/21/18 continue same medications             I will continue to monitor blood levels (Depakote, Tegretol, lithium, clozapine---a drug with a narrow therapeutic index= NTI) and associated labs for drug therapy implemented that require intense monitoring for toxicity as deemed appropriate based on current medication side effects and pharmacodynamically determined drug 1/2 lives. In summary, Jey Nguyen, is a 23 y.o.  male who presents with a severe exacerbation of the principal diagnosis of <principal problem not specified>  Patient's condition is worsening/not improving/not stable improving. Patient requires continued inpatient hospitalization for further stabilization, safety monitoring and medication management. I will continue to coordinate the provision of individual, milieu, occupational, group, and substance abuse therapies to address target symptoms/diagnoses as deemed appropriate for the individual patient. A coordinated, multidisplinary treatment team round was conducted with the patient (this team consists of the nurse, psychiatric unit pharmcist,  and writer). Complete current electronic health record for patient has been reviewed today including consultant notes, ancillary staff notes, nurses and psychiatric tech notes. Suicide risk assessment completed and patient deemed to be of low risk for suicide at this time. The following regarding medications was addressed during rounds with patient:   the risks and benefits of the proposed medication. The patient was given the opportunity to ask questions. Informed consent given to the use of the above medications. Will continue to adjust psychiatric and non-psychiatric medications (see above \"medication\" section and orders section for details) as deemed appropriate & based upon diagnoses and response to treatment.      I will continue to order blood tests/labs and diagnostic tests as deemed appropriate and review results as they become available (see orders for details and above listed lab/test results). I will order psychiatric records from previous [facility] hospitals to further elucidate the nature of patient's psychopathology and review once available. I will gather additional collateral information from friends, family and o/p treatment team to further elucidate the nature of patient's psychopathology and baselline level of psychiatric functioning. I certify that this patient's inpatient psychiatric hospital services furnished since the previous certification were, and continue to be, required for treatment that could reasonably be expected to improve the patient's condition, or for diagnostic study, and that the patient continues to need, on a daily basis, active treatment furnished directly by or requiring the supervision of inpatient psychiatric facility personnel. In addition, the hospital records show that services furnished were intensive treatment services, admission or related services, or equivalent services.     EXPECTED DISCHARGE DATE/DAY: TBD     DISPOSITION: Home       Signed By:   Bennett Chino MD  2/21/2018

## 2018-02-21 NOTE — BH NOTES
Pt is pleasant and well-mannered. Pt occasionally needs redirection. Pt took a shower this morning after breakfast and made bed. Pt was very involved in all groups that were given today. Communicates well with peers and staff. Pt observed eaten all meals and receiving medication from nurse. Planning for discharge tomorrow statement from pt. Pt has been handling self in a better state. Pt learned how to communicate with staff on expressing feelings and concerns. Will continue to monitor pt Q 15 checks.

## 2018-02-21 NOTE — PROGRESS NOTES
Maricel Munira actively participated in Spirituality Group about Spiritual Gifts on [address] [facility] unit    [address] [facility], .Div.    Paging Service [phone])

## 2018-02-21 NOTE — BH NOTES
GROUP THERAPY PROGRESS NOTE    The patient Alisha norton 23 y.o. male is participating in Coping Skills Group. Group time: 45 minutes    Personal goal for participation: To participate in self esteem [employer] game    Goal orientation:  personal    Group therapy participation: active    Therapeutic interventions reviewed and discussed: things pertaining to self esteem    Impression of participation:  The patient was attentive.     Terrence Tinajero  2/21/2018  10:12 AM

## 2018-02-21 NOTE — BH NOTES
GROUP THERAPY PROGRESS NOTE    The patient Jagdish norton 23 y.o. male is participating in Creative Expression Group. Group time: 1 hour    Personal goal for participation: To concentrate on selected task    Goal orientation: social    Group therapy participation: active    Therapeutic interventions reviewed and discussed: Crafts, games, music    Impression of participation: The patient was attentive.     Juhi Tinajero  2/21/2018  5:10 PM

## 2018-02-22 VITALS
BODY MASS INDEX: 24.87 KG/M2 | HEIGHT: 75 IN | HEART RATE: 80 BPM | RESPIRATION RATE: 16 BRPM | SYSTOLIC BLOOD PRESSURE: 118 MMHG | TEMPERATURE: 96.4 F | DIASTOLIC BLOOD PRESSURE: 78 MMHG | WEIGHT: 200 LBS | OXYGEN SATURATION: 98 %

## 2018-02-22 PROCEDURE — 74011250637 HC RX REV CODE- 250/637: Performed by: PSYCHIATRY & NEUROLOGY

## 2018-02-22 RX ORDER — ESCITALOPRAM OXALATE 5 MG/1
5 TABLET ORAL DAILY
Qty: 14 TAB | Refills: 1 | Status: SHIPPED | OUTPATIENT
Start: 2018-02-23 | End: 2018-03-09

## 2018-02-22 RX ORDER — OLANZAPINE 10 MG/1
10 TABLET ORAL 2 TIMES DAILY
Qty: 28 TAB | Refills: 1 | Status: SHIPPED | OUTPATIENT
Start: 2018-02-22 | End: 2018-03-08

## 2018-02-22 RX ADMIN — OLANZAPINE 10 MG: 10 TABLET, FILM COATED ORAL at 08:10

## 2018-02-22 RX ADMIN — FAMOTIDINE 20 MG: 20 TABLET ORAL at 08:09

## 2018-02-22 RX ADMIN — ESCITALOPRAM OXALATE 5 MG: 10 TABLET ORAL at 08:09

## 2018-02-22 NOTE — DISCHARGE INSTRUCTIONS
DISCHARGE SUMMARY from Nurse    The following personal items are in your possession at time of discharge:    Dental Appliances: None  Visual Aid: Glasses, With patient     Home Medications: None  Jewelry: None  Clothing: Shirt, Socks, Sweater, Undergarments, Pants, Footwear, Darlin  Other Valuables: Money (comment) ($8)  Personal Items Sent to Safe: $8      PATIENT INSTRUCTIONS:    If I feel that I can not keep these promises and I am at risk of hurting myself or others, I will call the crisis office and speak with a crisis worker who will assist me during my crisis. [facility]  [address]  [address]       Lifestyle Tips:  Appointment after discharge and follow up phone call:   After being discharged, if your appointment does not work for you, please feel free to contact the physician's office to change the appointment. Medications: Take your medicines exactly as instructed. Ask your doctor or nurse if you have questions about your medicines. Tell your doctor right away if you have problems with your medicines. Diet:   Follow any special diet orders from your doctor. Ask your doctor how much salt (sodium) you should have each day. Ask if you need to limit the amount or types of fluids you drink each day. Weight Monitoring: If you are overweight, ask about a weight loss plan that is right for you. Weight gain may mean that fluids are building up in your body. Weigh yourself every day at about the same time and write it down. Do Not Smoke: If you smoke, quit. Smoking is bad for your health. Avoid second hand smoke.    Call the [employer] for help at 7-745.603.1021 (5-463-Quit - Now)  Other Tips:   Avoid people with the flu or pneumonia   Keep all of your doctor appointments   Carry a list of your medications, allergies and the shots you have had              These are general instructions for a healthy lifestyle:    No smoking/ No tobacco products/ Avoid exposure to second hand smoke    Surgeon General's Warning:  Quitting smoking now greatly reduces serious risk to your health. Obesity, smoking, and sedentary lifestyle greatly increases your risk for illness    A healthy diet, regular physical exercise & weight monitoring are important for maintaining a healthy lifestyle        Recognize signs and symptoms of STROKE:    F-face looks uneven    A-arms unable to move or move unevenly    S-speech slurred or non-existent    T-time-call 911 as soon as signs and symptoms begin-DO NOT go       Back to bed or wait to see if you get better-TIME IS BRAIN. Warning Signs of HEART ATTACK     Call 911 if you have these symptoms:   Chest discomfort. Most heart attacks involve discomfort in the center of the chest that lasts more than a few minutes, or that goes away and comes back. It can feel like uncomfortable pressure, squeezing, fullness, or pain.  Discomfort in other areas of the upper body. Symptoms can include pain or discomfort in one or both arms, the back, neck, jaw, or stomach.  Shortness of breath with or without chest discomfort.  Other signs may include breaking out in a cold sweat, nausea, or lightheadedness. Don't wait more than five minutes to call 911 - MINUTES MATTER! Fast action can save your life. Calling 911 is almost always the fastest way to get lifesaving treatment. Emergency Medical Services staff can begin treatment when they arrive -- up to an hour sooner than if someone gets to the hospital by car. Myself and/or my family have received education about my diagnosis throughout my hospital stay. During my hospital stay, I and/or my family/caregiver were included in planning my care upon discharge. My needs were taken into consideration and I was included in my discharge planning.   I had an opportunity to ask questions. The discharge information has been reviewed with the patient. The patient verbalized understanding. Discharge medications reviewed with the patient and appropriate educational materials and side effects teaching were provided.

## 2018-02-22 NOTE — BH NOTES
Patient was discharged today. Discharge forms were signed and given to patient after being verified by unit clerk and patient. Discharge instructions were explained to patient and patient indicated understanding verbally. Patient has been compliant with taking medications and stated understanding the importance of taking medications upon discharge. Patient was given prescriptions and belongings, and was escorted from the unit by staff.

## 2018-02-22 NOTE — BH NOTES
Problem: Depressed Mood (Adult/Pediatric)  Goal: *STG: Complies with medication therapy  Outcome: Progressing Towards Goal  Pt is very visible in the milieu. Pt is less intrusive this evening shift. Pt is meds/meals compliant. No PRN was given this shift. Pt was visited his mother and boyfriend in the unit tonight.  Will continue to monitor q 15 for safety, mood and behavior changes.

## 2018-02-22 NOTE — BH NOTES
PSYCHIATRIC PROGRESS NOTE         Patient Name  Jagdish Cordoba   Date of Birth 1995   [facility] [id number]   Medical Record Number  [id number]      Age  23 y.o. PCP None   Admit date:  2/15/2018    Room Number  325/02  @ [facility]   Date of Service  2/22/2018          PSYCHOTHERAPY SESSION NOTE:  Length of psychotherapy session: 15 minutes    Main condition/diagnosis/issues treated during session today, 2/22/2018 : mood and anxiety and coping skill s    I employed Cognitive Behavioral therapy techniques, Reality-Oriented psychotherapy, as well as supportive psychotherapy in regards to various ongoing psychosocial stressors, including the following: pre-admission and current problems; housing issues; occupational issues; academic issues; legal issues; medical issues; and stress of hospitalization. Interpersonal relationship issues and psychodynamic conflicts explored. Attempts made to alleviate maladaptive patterns. We, also, worked on issues of denial & effects of substance dependency/use     Overall, patient is not progressing    Treatment Plan Update (reviewed an updated 2/22/2018) : I will modify psychotherapy tx plan by implementing more stress management strategies, building upon cognitive behavioral techniques, increasing coping skills, as well as shoring up psychological defenses). An extended energy and skill set was needed to engage pt in psychotherapy due to some of the following: resistiveness, complexity, negativity, confrontational nature, hostile behaviors, and/or severe abnormalities in thought processes/psychosis resulting in the loss of expressive/receptive language communication skills. E & M PROGRESS NOTE:         HISTORY         HISTORY OF PRESENT ILLNESS/INTERVAL HISTORY:  (reviewed/updated 2/22/2018).   CC:  Pt admitted under a voluntary basis for severe depression with suicidal ideations  proving to be an imminent danger to self and an inability to care for self. HISTORY OF PRESENT ILLNESS:    The patient, Kiki Root, is a 23 y.o. WHITE [place]  male with a past psychiatric history significant for depression , who presents at this time with complaints of (and/or evidence of) the following emotional symptoms: depression, suicidal thoughts/threats and anxiety. Additional symptomatology include anxiety, depression worse and difficulty sleeping. The above symptoms have been present for months . These symptoms are of moderate severity. These symptoms are intermittent/ fleeting in nature. The patient's condition has been precipitated by problems with family and psychosocial stressors (issues with relationship  ). Patient's condition made worse by continued illicit drug use and alcohol use as well as treatment noncompliance. UDS: positive ; BAL=0. He is feeling anxious and depressed and he has panic attacks and has inna feeling depressed and has no Ah or VH and no suicidal ideation       Kiki Root presents/reports/evidences the following emotional symptoms today, 2/22/2018: depression, suicidal thoughts/threats and anxiety. The above symptoms have been present for . These symptoms are of moderate in severity. The symptoms are constant  in nature. Additional symptomatology and features includeanxiety, depression worse and difficulty sleeping proccupations . 02/18/18: Patient still remain anxious, preoccupied, says with increase dose of Zyprexa he feels little better,repeatedly asking same questions,limited insight. Compliant with medications, denies SI/HI/AH  2/19/18 he is doing better and mood is better and no anger issues and no Si or HI and has no aggression and NO Si or HI and less paranoid and he is more bright today   2/20/18 he is still not sleeping well and not feeling sad or depressed and still feels anxious and still has pressured speech and he is less paranoid   2/21/18 he is sleeping better and compliant with his medications and no anger issues and he is not paranoid and less anxious and he is engaging well in talking and  No acting out behavior   2/22/18 he is doing better and mood is better and no anger issues and no self harm behavior and no issues with sleep and he has no SI or HI and no paranoid ideations and no psychotic features and no issues with others here       SIDE EFFECTS: (reviewed/updated 2/22/2018)  None reported or admitted to. No noted toxicity with use of Depakote/Tegretol/lithium/Clozaril/TCAs   ALLERGIES:(reviewed/updated 2/22/2018)  No Known Allergies   MEDICATIONS PRIOR TO ADMISSION:(reviewed/updated 2/22/2018)  Prescriptions Prior to Admission   Medication Sig    LORazepam (ATIVAN) 1 mg tablet Take 1 mg by mouth three (3) times daily. PAST MEDICAL HISTORY: Past medical history from the initial psychiatric evaluation has been reviewed (reviewed/updated 2/22/2018) with no additional updates (I asked patient and no additional past medical history provided). History reviewed. No pertinent past medical history. History reviewed. No pertinent surgical history. SOCIAL HISTORY: Social history from the initial psychiatric evaluation has been reviewed (reviewed/updated 2/22/2018) with no additional updates (I asked patient and no additional social history provided). Social History     Social History    Marital status: SINGLE     Spouse name: N/A    Number of children: N/A    Years of education: N/A     Occupational History    Not on file.      Social History Main Topics    Smoking status: Current Some Day Smoker    Smokeless tobacco: Current User    Alcohol use Yes    Drug use: Yes     Special: Marijuana    Sexual activity: Yes     Other Topics Concern    Not on file     Social History Narrative      FAMILY HISTORY: Family history from the initial psychiatric evaluation has been reviewed (reviewed/updated 2/22/2018) with no additional updates (I asked patient and no additional family history provided). History reviewed. No pertinent family history. REVIEW OF SYSTEMS: (reviewed/updated 2/22/2018)  Appetite:improved   Sleep: fitful   All other Review of Systems: Psychological ROS: positive for - anxiety and mood swings  Respiratory ROS: no cough, shortness of breath, or wheezing  Cardiovascular ROS: no chest pain or dyspnea on exertion         [facility] ([facility]):    MSE FINDINGS ARE WITHIN NORMAL LIMITS (WNL) UNLESS OTHERWISE STATED BELOW. ( ALL OF THE BELOW CATEGORIES OF THE MSE HAVE BEEN REVIEWED (reviewed 2/22/2018) AND UPDATED AS DEEMED APPROPRIATE )  General Presentation age appropriate and casually dressed, cooperative   Orientation oriented to time, place and person   Vital Signs  See below (reviewed 2/22/2018); Vital Signs (BP, Pulse, & Temp) are within normal limits if not listed below.    Gait and Station Stable/steady, no ataxia   Musculoskeletal System No extrapyramidal symptoms (EPS); no abnormal muscular movements or Tardive Dyskinesia (TD); muscle strength and tone are within normal limits   Language No aphasia or dysarthria   Speech:  normal pitch and normal volume   Thought Processes logical; normal rate of thoughts; fair abstract reasoning/computation   Thought Associations goal directed   Thought Content free of delusions and free of hallucinations   Suicidal Ideations none   Homicidal Ideations none   Mood:  anxious    Affect:  anxious   Memory recent  good   Memory remote:  good   Concentration/Attention:  distractable   Fund of Knowledge average   Insight:  limited   Reliability fair   Judgment:  poor          VITALS:     Patient Vitals for the past 24 hrs:   Temp Pulse Resp BP   02/22/18 0619 96.4 °F (35.8 °C) 80 16 118/78   02/21/18 1203 - 88 18 151/86     Wt Readings from Last 3 Encounters:   02/16/18 90.7 kg (200 lb)     Temp Readings from Last 3 Encounters:   02/22/18 96.4 °F (35.8 °C)     BP Readings from Last 3 Encounters:   02/22/18 118/78     Pulse Readings from Last 3 Encounters:   02/22/18 80            DATA     LABORATORY DATA:(reviewed/updated 2/22/2018)  Recent Results (from the past 24 hour(s))   CBC W/O DIFF    Collection Time: 02/21/18  9:31 AM   Result Value Ref Range    WBC 7.8 4.1 - 11.1 K/uL    RBC 4.73 4.10 - 5.70 M/uL    HGB 14.0 12.1 - 17.0 g/dL    HCT 41.6 36.6 - 50.3 %    MCV 87.9 80.0 - 99.0 FL    MCH 29.6 26.0 - 34.0 PG    MCHC 33.7 30.0 - 36.5 g/dL    RDW 12.8 11.5 - 14.5 %    PLATELET 703 688 - 291 K/uL    MPV 9.6 8.9 - 12.9 FL    NRBC 0.0 0  WBC    ABSOLUTE NRBC 0.00 0.00 - 0.01 K/uL     No results found for: VALF2, VALAC, VALP, VALPR, DS6, CRBAM, CRBAMP, CARB2, XCRBAM  No results found for: LITHM   RADIOLOGY REPORTS:(reviewed/updated 2/22/2018)  No results found.        MEDICATIONS     ALL MEDICATIONS:   Current Facility-Administered Medications   Medication Dose Route Frequency    OLANZapine (ZyPREXA) tablet 10 mg  10 mg Oral BID    famotidine (PEPCID) tablet 20 mg  20 mg Oral DAILY    LORazepam (ATIVAN) tablet 0.5 mg  0.5 mg Oral Q6H PRN    ziprasidone (GEODON) 20 mg in sterile water (preservative free) 1 mL injection  20 mg IntraMUSCular BID PRN    OLANZapine (ZyPREXA) tablet 5 mg  5 mg Oral Q6H PRN    benztropine (COGENTIN) tablet 2 mg  2 mg Oral BID PRN    benztropine (COGENTIN) injection 2 mg  2 mg IntraMUSCular BID PRN    LORazepam (ATIVAN) injection 2 mg  2 mg IntraMUSCular Q4H PRN    zolpidem (AMBIEN) tablet 10 mg  10 mg Oral QHS PRN    acetaminophen (TYLENOL) tablet 650 mg  650 mg Oral Q4H PRN    ibuprofen (MOTRIN) tablet 400 mg  400 mg Oral Q8H PRN    magnesium hydroxide (MILK OF MAGNESIA) 400 mg/5 mL oral suspension 30 mL  30 mL Oral DAILY PRN    escitalopram oxalate (LEXAPRO) tablet 5 mg  5 mg Oral DAILY      SCHEDULED MEDICATIONS:   Current Facility-Administered Medications   Medication Dose Route Frequency    OLANZapine (ZyPREXA) tablet 10 mg  10 mg Oral BID    famotidine (PEPCID) tablet 20 mg  20 mg Oral DAILY    escitalopram oxalate (LEXAPRO) tablet 5 mg  5 mg Oral DAILY          ASSESSMENT & PLAN     DIAGNOSES REQUIRING ACTIVE TREATMENT AND MONITORING: (reviewed/updated 2/22/2018)  Patient C/J[facility] Problem List:   Patient Active Hospital Problem List:   Psychosis (2/15/2018)    Assessment: hearing voices and paranoid     Plan: Zyprexa 5 mg po bid   02/18/18:Continue current treatment. Anxiety (2/16/2018)    Assessment: depression and anxiety     Plan: lexapro 5 mg po daily   2/19/18 increase Zyprexa to 7.5 mg bid   2/20/18 increase Zyprexa to 20 mg   2/21/18 continue same medications   2/22/18 will be discharged today to follow up as outpatient             I will continue to monitor blood levels (Depakote, Tegretol, lithium, clozapine---a drug with a narrow therapeutic index= NTI) and associated labs for drug therapy implemented that require intense monitoring for toxicity as deemed appropriate based on current medication side effects and pharmacodynamically determined drug 1/2 lives. In summary, Maricel Lemons, is a 23 y.o.  male who presents with a severe exacerbation of the principal diagnosis of <principal problem not specified>  Patient's condition is worsening/not improving/not stable improving. Patient requires continued inpatient hospitalization for further stabilization, safety monitoring and medication management. I will continue to coordinate the provision of individual, milieu, occupational, group, and substance abuse therapies to address target symptoms/diagnoses as deemed appropriate for the individual patient. A coordinated, multidisplinary treatment team round was conducted with the patient (this team consists of the nurse, psychiatric unit pharmcist,  and writer). Complete current electronic health record for patient has been reviewed today including consultant notes, ancillary staff notes, nurses and psychiatric tech notes.     Suicide risk assessment completed and patient deemed to be of low risk for suicide at this time. The following regarding medications was addressed during rounds with patient:   the risks and benefits of the proposed medication. The patient was given the opportunity to ask questions. Informed consent given to the use of the above medications. Will continue to adjust psychiatric and non-psychiatric medications (see above \"medication\" section and orders section for details) as deemed appropriate & based upon diagnoses and response to treatment. I will continue to order blood tests/labs and diagnostic tests as deemed appropriate and review results as they become available (see orders for details and above listed lab/test results). I will order psychiatric records from previous [facility] hospitals to further elucidate the nature of patient's psychopathology and review once available. I will gather additional collateral information from friends, family and o/p treatment team to further elucidate the nature of patient's psychopathology and baselline level of psychiatric functioning. I certify that this patient's inpatient psychiatric hospital services furnished since the previous certification were, and continue to be, required for treatment that could reasonably be expected to improve the patient's condition, or for diagnostic study, and that the patient continues to need, on a daily basis, active treatment furnished directly by or requiring the supervision of inpatient psychiatric facility personnel. In addition, the hospital records show that services furnished were intensive treatment services, admission or related services, or equivalent services.     EXPECTED DISCHARGE DATE/DAY: TBD     DISPOSITION: Home       Signed By:   Deejay Kurtz MD  2/22/2018

## 2018-02-22 NOTE — DISCHARGE SUMMARY
PSYCHIATRIC DISCHARGE SUMMARY         IDENTIFICATION:    Patient Name  Cuate Molina   Date of Birth 1995   [facility] [id number]   Medical Record Number  [id number]      Age  23 y.o. PCP None   Admit date:  2/15/2018    Discharge date: 2/22/2018   Room Number  325/02  @ [facility]   Date of Service  2/22/2018            TYPE OF DISCHARGE: REGULAR               CONDITION AT DISCHARGE: improved       PROVISIONAL & DISCHARGE DIAGNOSES:    Problem List  Never Reviewed          Codes Class    Anxiety ICD-10-CM: F41.9  ICD-9-CM: 300.00         Psychosis ICD-10-CM: F29  ICD-9-CM: 298.9               Active Hospital Problems    Anxiety      Psychosis        DISCHARGE DIAGNOSIS:   Axis I:  SEE ABOVE  Axis II: SEE ABOVE  Axis III: SEE ABOVE  Axis IV:  lack of structure  Axis V:  35 on admission, 55 on discharge 65(baseline)       CC & HISTORY OF PRESENT ILLNESS:  CC:  Pt admitted under a voluntary basis for severe depression with suicidal ideations  proving to be an imminent danger to self and an inability to care for self. HISTORY OF PRESENT ILLNESS:    The patient, Cuate Molina, is a 23 y. o.  WHITE [place]  male with a past psychiatric history significant for depression , who presents at this time with complaints of (and/or evidence of) the following emotional symptoms: depression, suicidal thoughts/threats and anxiety.  Additional symptomatology include anxiety, depression worse and difficulty sleeping.  The above symptoms have been present for months . These symptoms are of moderate severity. These symptoms are intermittent/ fleeting in nature.  The patient's condition has been precipitated by problems with family and psychosocial stressors (issues with relationship  ).  Patient's condition made worse by continued illicit drug use and alcohol use as well as treatment noncompliance. UDS: positive ; BAL=0.  He is feeling anxious and depressed and he has panic attacks and has inna feeling depressed and has no Ah or VH and no suicidal ideation         Rosie Toro presents/reports/evidences the following emotional symptoms today, 2/22/2018: depression, suicidal thoughts/threats and anxiety. The above symptoms have been present for . These symptoms are of moderate in severity. The symptoms are constant  in nature. Additional symptomatology and features includeanxiety, depression worse and difficulty sleeping proccupations . 02/18/18: Patient still remain anxious, preoccupied, says with increase dose of Zyprexa he feels little better,repeatedly asking same questions,limited insight. Compliant with medications, denies SI/HI/AH  2/19/18 he is doing better and mood is better and no anger issues and no Si or HI and has no aggression and NO Si or HI and less paranoid and he is more bright today   2/20/18 he is still not sleeping well and not feeling sad or depressed and still feels anxious and still has pressured speech and he is less paranoid   2/21/18 he is sleeping better and compliant with his medications and no anger issues and he is not paranoid and less anxious and he is engaging well in talking and  No acting out behavior   2/22/18 he is doing better and mood is better and no anger issues and no self harm behavior and no issues with sleep and he has no SI or HI and no paranoid ideations and no psychotic features and no issues with others here        SOCIAL HISTORY:    Social History     Social History    Marital status: SINGLE     Spouse name: N/A    Number of children: N/A    Years of education: N/A     Occupational History    Not on file. Social History Main Topics    Smoking status: Current Some Day Smoker    Smokeless tobacco: Current User    Alcohol use Yes    Drug use: Yes     Special: Marijuana    Sexual activity: Yes     Other Topics Concern    Not on file     Social History Narrative      FAMILY HISTORY:   History reviewed. No pertinent family history. HOSPITALIZATION COURSE:    Maribeth Valdez was admitted to the inpatient psychiatric unit [facility] for acute psychiatric stabilization in regards to symptomatology as described in the HPI above. The differential diagnosis at time of admission included: bipolar dis vs. Borderline vs substance abuse . While on the unit Maribeth Valdez was involved in individual, group, occupational and milieu therapy. Psychiatric medications were adjusted during this hospitalization including Olanzapine . Maribeth Valdez demonstrated a slow, but progressive improvement in overall condition. Much of patient's depression appeared to be related to situational stressors, effects of drugs of abuse, and psychological factors. Please see individual progress notes for more specific details regarding patient's hospitalization course. At time of discharge, Maribeth Valdez is without significant problems of depression psychosis  antonio. Patient free of suicidal and homicidal ideations (appears to be at very low risk of suicide or homicide) and reports many positive predictive factors in terms of not attempting suicide or homicide. Overall presentation at time of discharge is most consistent with the diagnosis of bipolar disorder  And anxiety disorder . Patient with request for discharge today. There are no grounds to seek a TDO. Patient has maximized benefit to be derived from acute inpatient psychiatric treatment. All members of the treatment team concur with each other in regards to plans for discharge today per patient's request.  Patient and family are aware and in agreement with discharge and discharge plan. Per my last note:          LABS AND IMAGAING:    Labs Reviewed   CBC WITH AUTOMATED DIFF - Abnormal; Notable for the following:        Result Value    WBC 13.5 (*)     PLATELET 838 (*)     ABS. NEUTROPHILS 8.4 (*)     ABS.  MONOCYTES 1.5 (*)     All other components within normal limits   METABOLIC PANEL, COMPREHENSIVE - Abnormal; Notable for the following: Anion gap 16 (*)     Glucose 117 (*)     All other components within normal limits   URINALYSIS W/ REFLEX CULTURE - Abnormal; Notable for the following:     Appearance CLOUDY (*)     Protein 30 (*)     Ketone 40 (*)     Leukocyte Esterase SMALL (*)     Mucus 1+ (*)     All other components within normal limits   DRUG SCREEN, URINE - Abnormal; Notable for the following:     THC (TH-CANNABINOL) POSITIVE (*)     All other components within normal limits   LIPID PANEL - Abnormal; Notable for the following:     Triglyceride 276 (*)     All other components within normal limits   ETHYL ALCOHOL   BILIRUBIN, CONFIRM   TSH 3RD GENERATION   GLUCOSE, FASTING   CBC W/O DIFF     No results found for: DS35, PHEN, PHENO, PHENT, DILF, DS39, PHENY, PTN, VALF2, VALAC, VALP, VALPR, DS6, CRBAM, CRBAMP, CARB2, XCRBAM  Admission on 02/15/2018, Discharged on 02/22/2018   Component Date Value Ref Range Status    WBC 02/15/2018 13.5* 4.1 - 11.1 K/uL Final    RBC 02/15/2018 5.24  4.10 - 5.70 M/uL Final    HGB 02/15/2018 15.6  12.1 - 17.0 g/dL Final    HCT 02/15/2018 44.8  36.6 - 50.3 % Final    MCV 02/15/2018 85.5  80.0 - 99.0 FL Final    MCH 02/15/2018 29.8  26.0 - 34.0 PG Final    MCHC 02/15/2018 34.8  30.0 - 36.5 g/dL Final    RDW 02/15/2018 13.2  11.5 - 14.5 % Final    PLATELET 76/01/7007 905* 150 - 400 K/uL Final    MPV 02/15/2018 9.9  8.9 - 12.9 FL Final    NRBC 02/15/2018 0.0  0  WBC Final    ABSOLUTE NRBC 02/15/2018 0.00  0.00 - 0.01 K/uL Final    NEUTROPHILS 02/15/2018 62  32 - 75 % Final    LYMPHOCYTES 02/15/2018 26  12 - 49 % Final    MONOCYTES 02/15/2018 11  5 - 13 % Final    EOSINOPHILS 02/15/2018 0  0 - 7 % Final    BASOPHILS 02/15/2018 1  0 - 1 % Final    IMMATURE GRANULOCYTES 02/15/2018 0  0.0 - 0.5 % Final    ABS. NEUTROPHILS 02/15/2018 8.4* 1.8 - 8.0 K/UL Final    ABS. LYMPHOCYTES 02/15/2018 3.5  0.8 - 3.5 K/UL Final    ABS. MONOCYTES 02/15/2018 1.5* 0.0 - 1.0 K/UL Final    ABS. EOSINOPHILS 02/15/2018 0.0  0.0 - 0.4 K/UL Final    ABS. BASOPHILS 02/15/2018 0.1  0.0 - 0.1 K/UL Final    ABS. IMM. GRANS. 02/15/2018 0.0  0.00 - 0.04 K/UL Final    DF 02/15/2018 AUTOMATED    Final    Sodium 02/15/2018 139  136 - 145 mmol/L Final    Potassium 02/15/2018 3.6  3.5 - 5.1 mmol/L Final    Chloride 02/15/2018 102  97 - 108 mmol/L Final    CO2 02/15/2018 21  21 - 32 mmol/L Final    Anion gap 02/15/2018 16* 5 - 15 mmol/L Final    Glucose 02/15/2018 117* 65 - 100 mg/dL Final    BUN 02/15/2018 17  6 - 20 MG/DL Final    Creatinine 02/15/2018 1.11  0.70 - 1.30 MG/DL Final    BUN/Creatinine ratio 02/15/2018 15  12 - 20   Final    GFR est AA 02/15/2018 >60  >60 ml/min/1.73m2 Final    GFR est non-AA 02/15/2018 >60  >60 ml/min/1.73m2 Final    Calcium 02/15/2018 9.7  8.5 - 10.1 MG/DL Final    Bilirubin, total 02/15/2018 0.6  0.2 - 1.0 MG/DL Final    ALT (SGPT) 02/15/2018 45  12 - 78 U/L Final    AST (SGOT) 02/15/2018 27  15 - 37 U/L Final    Alk.  phosphatase 02/15/2018 57  45 - 117 U/L Final    Protein, total 02/15/2018 8.0  6.4 - 8.2 g/dL Final    Albumin 02/15/2018 4.4  3.5 - 5.0 g/dL Final    Globulin 02/15/2018 3.6  2.0 - 4.0 g/dL Final    A-G Ratio 02/15/2018 1.2  1.1 - 2.2   Final    Color 02/15/2018 DARK YELLOW    Final    Appearance 02/15/2018 CLOUDY* CLEAR   Final    Specific gravity 02/15/2018 1.020  1.003 - 1.030   Final    pH (UA) 02/15/2018 6.0  5.0 - 8.0   Final    Protein 02/15/2018 30* NEG mg/dL Final    Glucose 02/15/2018 NEGATIVE   NEG mg/dL Final    Ketone 02/15/2018 40* NEG mg/dL Final    Blood 02/15/2018 NEGATIVE   NEG   Final    Urobilinogen 02/15/2018 0.2  0.2 - 1.0 EU/dL Final    Nitrites 02/15/2018 NEGATIVE   NEG   Final    Leukocyte Esterase 02/15/2018 SMALL* NEG   Final    WBC 02/15/2018 0-4  0 - 4 /hpf Final    RBC 02/15/2018 0-5  0 - 5 /hpf Final    Epithelial cells 02/15/2018 FEW  FEW /lpf Final    Bacteria 02/15/2018 NEGATIVE   NEG /hpf Final    UA:UC IF INDICATED 02/15/2018 CULTURE NOT INDICATED BY UA RESULT  CNI   Final    Mucus 02/15/2018 1+* NEG /lpf Final    AMPHETAMINES 02/15/2018 NEGATIVE   NEG   Final    BARBITURATES 02/15/2018 NEGATIVE   NEG   Final    BENZODIAZEPINES 02/15/2018 NEGATIVE   NEG   Final    COCAINE 02/15/2018 NEGATIVE   NEG   Final    METHADONE 02/15/2018 NEGATIVE   NEG   Final    OPIATES 02/15/2018 NEGATIVE   NEG   Final    PCP(PHENCYCLIDINE) 02/15/2018 NEGATIVE   NEG   Final    THC (TH-CANNABINOL) 02/15/2018 POSITIVE* NEG   Final    Drug screen comment 02/15/2018 (NOTE)   Final    ALCOHOL(ETHYL),SERUM 02/15/2018 <10  <10 MG/DL Final    Bilirubin UA, confirm 02/15/2018 NEGATIVE   NEG   Final    TSH 02/19/2018 0.95  0.36 - 3.74 uIU/mL Final    LIPID PROFILE 02/19/2018        Final    Cholesterol, total 02/19/2018 130  <200 MG/DL Final    Triglyceride 02/19/2018 276* <150 MG/DL Final    HDL Cholesterol 02/19/2018 31  MG/DL Final    LDL, calculated 02/19/2018 43.8  0 - 100 MG/DL Final    VLDL, calculated 02/19/2018 55.2  MG/DL Final    CHOL/HDL Ratio 02/19/2018 4.2  0 - 5.0   Final    Glucose 02/19/2018 89  65 - 100 MG/DL Final    WBC 02/21/2018 7.8  4.1 - 11.1 K/uL Final    RBC 02/21/2018 4.73  4.10 - 5.70 M/uL Final    HGB 02/21/2018 14.0  12.1 - 17.0 g/dL Final    HCT 02/21/2018 41.6  36.6 - 50.3 % Final    MCV 02/21/2018 87.9  80.0 - 99.0 FL Final    MCH 02/21/2018 29.6  26.0 - 34.0 PG Final    MCHC 02/21/2018 33.7  30.0 - 36.5 g/dL Final    RDW 02/21/2018 12.8  11.5 - 14.5 % Final    PLATELET 47/98/9418 553  150 - 400 K/uL Final    MPV 02/21/2018 9.6  8.9 - 12.9 FL Final    NRBC 02/21/2018 0.0  0  WBC Final    ABSOLUTE NRBC 02/21/2018 0.00  0.00 - 0.01 K/uL Final     No results found. DISPOSITION:    Home. Patient to f/u with drug/etoh rehabilitation, psychiatric, and psychotherapy appointments.  Patient is to f/u with internist as directed. FOLLOW-UP CARE:    Activity as tolerated and no driving for today  Regular Diet  Wound Care: none needed. Follow-up Information     Follow up With Details Comments Contact Info    Ellie Isaac NP  Please follow up with Ellie Isaac NP on Thurs. 3/15/18 at 12:30pm. Arrive 20-30 minutes early to complete paperwork. Bring photo ID and insurance card. You will also be scheduled with a therapist in the future. [facility]. [address] [place] [address], [address] [place] [address]  Phone:  [phone]   Fax:   [phone]       None   None ([phone]) Patient stated that they have no PCP      therapists  If you prefer to try to find a therapist sooner than when John can arrange, please see attached list of therapists and/or seek one on [email or web address]                  PROGNOSIS:   Good /based on nature of patient's pathology/ies and treatment compliance issues. Prognosis is greatly dependent upon patient's ability to remain sober and to follow up with drug/etoh rehabilitation and psychiatric/psychotherapy appointments as well as to comply with psychiatric medications as prescribed. DISCHARGE MEDICATIONS:     Informed consent given for the use of following psychotropic medications:  Discharge Medication List as of 2/22/2018 10:16 AM      START taking these medications    Details   escitalopram oxalate (LEXAPRO) 5 mg tablet Take 1 Tab by mouth daily for 14 days. Indications: ANXIETY WITH DEPRESSION, Print, Disp-14 Tab, R-1      OLANZapine (ZYPREXA) 10 mg tablet Take 1 Tab by mouth two (2) times a day for 14 days.  Indications: DEPRESSION TREATMENT ADJUNCT, Sveta associated with Bipolar Disorder, Print, Disp-28 Tab, R-1         STOP taking these medications       LORazepam (ATIVAN) 1 mg tablet Comments:   Reason for Stopping:                      A coordinated, multidisplinary treatment team round was conducted with Johan Cerda Adonis---this is done daily here at [facility]. This team consists of the nurse, psychiatric unit pharmcist,  and writer. I have spent greater than 35 minutes on discharge work.     Signed:  Isis Gomez MD  2/22/2018

## 2018-02-22 NOTE — BH NOTES
Behavioral Health Transition Record to Provider    Patient Name: Sylvie David  YOB: 1995  Medical Record Number: [id number]  Date of Admission: 2/15/2018  Date of Discharge: 2/22/18    Attending Provider: Simon Restrepo MD  Discharging Provider: Dr. Simon Restrepo  To contact this individual call [phone] and ask the  to page. If unavailable, ask to be transferred to [place] Provider on call. [facility] Provider will be available on call 24/7 and during holidays. Primary Care Provider: None    No Known Allergies    Reason for Admission: psychosis/anxiety    Admission Diagnosis: Psychosis, unspecified psychosis type  Anxiety    * No surgery found *    Results for orders placed or performed during the hospital encounter of 02/15/18   CBC WITH AUTOMATED DIFF   Result Value Ref Range    WBC 13.5 (H) 4.1 - 11.1 K/uL    RBC 5.24 4.10 - 5.70 M/uL    HGB 15.6 12.1 - 17.0 g/dL    HCT 44.8 36.6 - 50.3 %    MCV 85.5 80.0 - 99.0 FL    MCH 29.8 26.0 - 34.0 PG    MCHC 34.8 30.0 - 36.5 g/dL    RDW 13.2 11.5 - 14.5 %    PLATELET 112 (H) 172 - 400 K/uL    MPV 9.9 8.9 - 12.9 FL    NRBC 0.0 0  WBC    ABSOLUTE NRBC 0.00 0.00 - 0.01 K/uL    NEUTROPHILS 62 32 - 75 %    LYMPHOCYTES 26 12 - 49 %    MONOCYTES 11 5 - 13 %    EOSINOPHILS 0 0 - 7 %    BASOPHILS 1 0 - 1 %    IMMATURE GRANULOCYTES 0 0.0 - 0.5 %    ABS. NEUTROPHILS 8.4 (H) 1.8 - 8.0 K/UL    ABS. LYMPHOCYTES 3.5 0.8 - 3.5 K/UL    ABS. MONOCYTES 1.5 (H) 0.0 - 1.0 K/UL    ABS. EOSINOPHILS 0.0 0.0 - 0.4 K/UL    ABS. BASOPHILS 0.1 0.0 - 0.1 K/UL    ABS. IMM.  GRANS. 0.0 0.00 - 0.04 K/UL    DF AUTOMATED     METABOLIC PANEL, COMPREHENSIVE   Result Value Ref Range    Sodium 139 136 - 145 mmol/L    Potassium 3.6 3.5 - 5.1 mmol/L    Chloride 102 97 - 108 mmol/L    CO2 21 21 - 32 mmol/L    Anion gap 16 (H) 5 - 15 mmol/L    Glucose 117 (H) 65 - 100 mg/dL    BUN 17 6 - 20 MG/DL    Creatinine 1.11 0.70 - 1.30 MG/DL    BUN/Creatinine ratio 15 12 - 20      GFR est AA >60 >60 ml/min/1.73m2    GFR est non-AA >60 >60 ml/min/1.73m2    Calcium 9.7 8.5 - 10.1 MG/DL    Bilirubin, total 0.6 0.2 - 1.0 MG/DL    ALT (SGPT) 45 12 - 78 U/L    AST (SGOT) 27 15 - 37 U/L    Alk.  phosphatase 57 45 - 117 U/L    Protein, total 8.0 6.4 - 8.2 g/dL    Albumin 4.4 3.5 - 5.0 g/dL    Globulin 3.6 2.0 - 4.0 g/dL    A-G Ratio 1.2 1.1 - 2.2     URINALYSIS W/ REFLEX CULTURE   Result Value Ref Range    Color DARK YELLOW      Appearance CLOUDY (A) CLEAR      Specific gravity 1.020 1.003 - 1.030      pH (UA) 6.0 5.0 - 8.0      Protein 30 (A) NEG mg/dL    Glucose NEGATIVE  NEG mg/dL    Ketone 40 (A) NEG mg/dL    Blood NEGATIVE  NEG      Urobilinogen 0.2 0.2 - 1.0 EU/dL    Nitrites NEGATIVE  NEG      Leukocyte Esterase SMALL (A) NEG      WBC 0-4 0 - 4 /hpf    RBC 0-5 0 - 5 /hpf    Epithelial cells FEW FEW /lpf    Bacteria NEGATIVE  NEG /hpf    UA:UC IF INDICATED CULTURE NOT INDICATED BY UA RESULT CNI      Mucus 1+ (A) NEG /lpf   DRUG SCREEN, URINE   Result Value Ref Range    AMPHETAMINES NEGATIVE  NEG      BARBITURATES NEGATIVE  NEG      BENZODIAZEPINES NEGATIVE  NEG      COCAINE NEGATIVE  NEG      METHADONE NEGATIVE  NEG      OPIATES NEGATIVE  NEG      PCP(PHENCYCLIDINE) NEGATIVE  NEG      THC (TH-CANNABINOL) POSITIVE (A) NEG      Drug screen comment (NOTE)    ETHYL ALCOHOL   Result Value Ref Range    ALCOHOL(ETHYL),SERUM <10 <10 MG/DL   BILIRUBIN, CONFIRM   Result Value Ref Range    Bilirubin UA, confirm NEGATIVE  NEG     TSH 3RD GENERATION   Result Value Ref Range    TSH 0.95 0.36 - 3.74 uIU/mL   LIPID PANEL   Result Value Ref Range    LIPID PROFILE          Cholesterol, total 130 <200 MG/DL    Triglyceride 276 (H) <150 MG/DL    HDL Cholesterol 31 MG/DL    LDL, calculated 43.8 0 - 100 MG/DL    VLDL, calculated 55.2 MG/DL    CHOL/HDL Ratio 4.2 0 - 5.0     GLUCOSE, FASTING   Result Value Ref Range    Glucose 89 65 - 100 MG/DL   CBC W/O DIFF   Result Value Ref Range    WBC 7.8 4.1 - 11.1 K/uL    RBC 4.73 4.10 - 5.70 M/uL    HGB 14.0 12.1 - 17.0 g/dL    HCT 41.6 36.6 - 50.3 %    MCV 87.9 80.0 - 99.0 FL    MCH 29.6 26.0 - 34.0 PG    MCHC 33.7 30.0 - 36.5 g/dL    RDW 12.8 11.5 - 14.5 %    PLATELET 687 501 - 299 K/uL    MPV 9.6 8.9 - 12.9 FL    NRBC 0.0 0  WBC    ABSOLUTE NRBC 0.00 0.00 - 0.01 K/uL       Immunizations administered during this encounter:   Immunization History   Administered Date(s) Administered    Influenza Vaccine (Quad) PF 02/16/2018       Screening for Metabolic Disorders for Patients on Antipsychotic Medications  (Data obtained from the EMR)    Estimated Body Mass Index  Estimated body mass index is 25 kg/(m^2) as calculated from the following:    Height as of this encounter: 6' 3\" (1.905 m). Weight as of this encounter: 90.7 kg (200 lb). Vital Signs/Blood Pressure  Visit Vitals    /78    Pulse 80    Temp 96.4 °F (35.8 °C)    Resp 16    Ht 6' 3\" (1.905 m)    Wt 90.7 kg (200 lb)    SpO2 98%    BMI 25 kg/m2       Blood Glucose/Hemoglobin A1c  Lab Results   Component Value Date/Time    Glucose 89 02/19/2018 04:15 AM       No results found for: HBA1C, HGBE8, BMQ7LOTP     Lipid Panel  Lab Results   Component Value Date/Time    Cholesterol, total 130 02/19/2018 04:15 AM    HDL Cholesterol 31 02/19/2018 04:15 AM    LDL, calculated 43.8 02/19/2018 04:15 AM    Triglyceride 276 (H) 02/19/2018 04:15 AM    CHOL/HDL Ratio 4.2 02/19/2018 04:15 AM        Discharge Diagnosis: Psychosis, NOS    Discharge Plan: The patient appeared to be in a good mood and had an appropriate and congruent affect. He was discharged and will be transported home by his family. He will [facility] up with [facility] on 3/15 and they will schedule him with a therapist when one becomes available. The patient was additionally provided with names/referrals of other therapists if he doesn't wish to wait for [place] to make these arrangements. Continuing care paperwork was faxed. Discharge Medication List and Instructions:   Current Discharge Medication List      START taking these medications    Details   escitalopram oxalate (LEXAPRO) 5 mg tablet Take 1 Tab by mouth daily for 14 days. Indications: ANXIETY WITH DEPRESSION  Qty: 14 Tab, Refills: 1      OLANZapine (ZYPREXA) 10 mg tablet Take 1 Tab by mouth two (2) times a day for 14 days. Indications: DEPRESSION TREATMENT ADJUNCT, Sveta associated with Bipolar Disorder  Qty: 28 Tab, Refills: 1         STOP taking these medications       LORazepam (ATIVAN) 1 mg tablet Comments:   Reason for Stopping:               Unresulted Labs     None        To obtain results of studies pending at discharge, please contact N/A    Follow-up Information     Follow up With Details Comments Contact Info    Blanca Nuñez, NP  Please follow up with Blanca Nuñez NP on Thurs. 3/15/18 at 12:30pm. Arrive 20-30 minutes early to complete paperwork. Bring photo ID and insurance card. You will also be scheduled with a therapist in the future. [facility]0 [facility]. [address] [place] [address], [address] [place] [address]  Phone:  [phone]   Fax:   [phone]       None   None (395) Patient stated that they have no PCP      therapists  If you prefer to try to find a therapist sooner than when John can arrange, please see attached list of therapists and/or seek one on [email or web address]           Advanced Directive:   Does the patient have an appointed surrogate decision maker? No  Does the patient have a Medical Advance Directive? No  Does the patient have a Psychiatric Advance Directive? No  If the patient does not have a surrogate or Medical Advance Directive AND Psychiatric Advance Directive, the patient was offered information on these advance directives Other No    Patient Instructions: Please continue all medications until otherwise directed by physician.       Tobacco Cessation Discharge Plan:   Is the patient a smoker and needs referral for smoking cessation? No  Patient referred to the following for smoking cessation with an appointment? No    Patient was offered medication to assist with smoking cessation at discharge? No  Was education for smoking cessation added to the discharge instructions? No    Alcohol/Substance Abuse Discharge Plan:   Does the patient have a history of substance/alcohol abuse and requires a referral for treatment? No  Patient referred to the following for substance/alcohol abuse treatment with an appointment? No  Patient was offered medication to assist with alcohol cessation at discharge? No  Was education for substance/alcohol abuse added to discharge instructions?  No    Patient discharged to Home; discussed with patient/caregiver